# Patient Record
Sex: MALE | Race: WHITE | NOT HISPANIC OR LATINO | Employment: FULL TIME | URBAN - METROPOLITAN AREA
[De-identification: names, ages, dates, MRNs, and addresses within clinical notes are randomized per-mention and may not be internally consistent; named-entity substitution may affect disease eponyms.]

---
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
## 2020-01-10 ENCOUNTER — HOSPITAL ENCOUNTER (OUTPATIENT)
Dept: LAB | Facility: MEDICAL CENTER | Age: 20
End: 2020-01-10
Attending: PSYCHIATRY & NEUROLOGY
Payer: MEDICAID

## 2020-01-10 LAB
ALBUMIN SERPL BCP-MCNC: 5.1 G/DL (ref 3.2–4.9)
ALBUMIN/GLOB SERPL: 2.6 G/DL
ALP SERPL-CCNC: 79 U/L (ref 30–99)
ALT SERPL-CCNC: 72 U/L (ref 2–50)
ANION GAP SERPL CALC-SCNC: 10 MMOL/L (ref 0–11.9)
APPEARANCE UR: CLEAR
AST SERPL-CCNC: 30 U/L (ref 12–45)
BASOPHILS # BLD AUTO: 0.5 % (ref 0–1.8)
BASOPHILS # BLD: 0.02 K/UL (ref 0–0.12)
BILIRUB SERPL-MCNC: 1.1 MG/DL (ref 0.1–1.5)
BILIRUB UR QL STRIP.AUTO: NEGATIVE
BUN SERPL-MCNC: 15 MG/DL (ref 8–22)
CALCIUM SERPL-MCNC: 9.3 MG/DL (ref 8.5–10.5)
CHLORIDE SERPL-SCNC: 102 MMOL/L (ref 96–112)
CHOLEST SERPL-MCNC: 165 MG/DL (ref 100–199)
CO2 SERPL-SCNC: 27 MMOL/L (ref 20–33)
COLOR UR: ABNORMAL
CREAT SERPL-MCNC: 0.93 MG/DL (ref 0.5–1.4)
EOSINOPHIL # BLD AUTO: 0.1 K/UL (ref 0–0.51)
EOSINOPHIL NFR BLD: 2.5 % (ref 0–6.9)
ERYTHROCYTE [DISTWIDTH] IN BLOOD BY AUTOMATED COUNT: 40 FL (ref 35.9–50)
GLOBULIN SER CALC-MCNC: 2 G/DL (ref 1.9–3.5)
GLUCOSE SERPL-MCNC: 75 MG/DL (ref 65–99)
GLUCOSE UR STRIP.AUTO-MCNC: NEGATIVE MG/DL
HCT VFR BLD AUTO: 49.6 % (ref 42–52)
HDLC SERPL-MCNC: 56 MG/DL
HGB BLD-MCNC: 16.5 G/DL (ref 14–18)
IMM GRANULOCYTES # BLD AUTO: 0.02 K/UL (ref 0–0.11)
IMM GRANULOCYTES NFR BLD AUTO: 0.5 % (ref 0–0.9)
KETONES UR STRIP.AUTO-MCNC: 40 MG/DL
LDLC SERPL CALC-MCNC: 95 MG/DL
LEUKOCYTE ESTERASE UR QL STRIP.AUTO: NEGATIVE
LYMPHOCYTES # BLD AUTO: 1.79 K/UL (ref 1–4.8)
LYMPHOCYTES NFR BLD: 44.6 % (ref 22–41)
MCH RBC QN AUTO: 29.4 PG (ref 27–33)
MCHC RBC AUTO-ENTMCNC: 33.3 G/DL (ref 33.7–35.3)
MCV RBC AUTO: 88.4 FL (ref 81.4–97.8)
MICRO URNS: ABNORMAL
MONOCYTES # BLD AUTO: 0.38 K/UL (ref 0–0.85)
MONOCYTES NFR BLD AUTO: 9.5 % (ref 0–13.4)
NEUTROPHILS # BLD AUTO: 1.7 K/UL (ref 1.82–7.42)
NEUTROPHILS NFR BLD: 42.4 % (ref 44–72)
NITRITE UR QL STRIP.AUTO: NEGATIVE
NRBC # BLD AUTO: 0 K/UL
NRBC BLD-RTO: 0 /100 WBC
PH UR STRIP.AUTO: 5.5 [PH] (ref 5–8)
PLATELET # BLD AUTO: 285 K/UL (ref 164–446)
PMV BLD AUTO: 9.5 FL (ref 9–12.9)
POTASSIUM SERPL-SCNC: 4.1 MMOL/L (ref 3.6–5.5)
PROT SERPL-MCNC: 7.1 G/DL (ref 6–8.2)
PROT UR QL STRIP: NEGATIVE MG/DL
RBC # BLD AUTO: 5.61 M/UL (ref 4.7–6.1)
RBC UR QL AUTO: NEGATIVE
SODIUM SERPL-SCNC: 139 MMOL/L (ref 135–145)
SP GR UR STRIP.AUTO: 1.03
TRIGL SERPL-MCNC: 68 MG/DL (ref 0–149)
UROBILINOGEN UR STRIP.AUTO-MCNC: 1 MG/DL
WBC # BLD AUTO: 4 K/UL (ref 4.8–10.8)

## 2020-01-10 PROCEDURE — 81003 URINALYSIS AUTO W/O SCOPE: CPT

## 2020-01-10 PROCEDURE — 83001 ASSAY OF GONADOTROPIN (FSH): CPT

## 2020-01-10 PROCEDURE — 82746 ASSAY OF FOLIC ACID SERUM: CPT

## 2020-01-10 PROCEDURE — 81291 MTHFR GENE: CPT

## 2020-01-10 PROCEDURE — 84402 ASSAY OF FREE TESTOSTERONE: CPT

## 2020-01-10 PROCEDURE — 80061 LIPID PANEL: CPT

## 2020-01-10 PROCEDURE — 85025 COMPLETE CBC W/AUTO DIFF WBC: CPT

## 2020-01-10 PROCEDURE — 84270 ASSAY OF SEX HORMONE GLOBUL: CPT

## 2020-01-10 PROCEDURE — 82306 VITAMIN D 25 HYDROXY: CPT

## 2020-01-10 PROCEDURE — 84436 ASSAY OF TOTAL THYROXINE: CPT

## 2020-01-10 PROCEDURE — 84443 ASSAY THYROID STIM HORMONE: CPT

## 2020-01-10 PROCEDURE — 80053 COMPREHEN METABOLIC PANEL: CPT

## 2020-01-10 PROCEDURE — 84403 ASSAY OF TOTAL TESTOSTERONE: CPT

## 2020-01-10 PROCEDURE — 36415 COLL VENOUS BLD VENIPUNCTURE: CPT

## 2020-01-10 PROCEDURE — 83002 ASSAY OF GONADOTROPIN (LH): CPT

## 2020-01-10 PROCEDURE — 82607 VITAMIN B-12: CPT

## 2020-01-11 LAB
25(OH)D3 SERPL-MCNC: 30 NG/ML (ref 30–100)
FOLATE SERPL-MCNC: 22.3 NG/ML
FSH SERPL-ACNC: 5 MIU/ML (ref 1.5–12.4)
LH SERPL-ACNC: 3 IU/L (ref 1.7–8.6)
T4 SERPL-MCNC: 9.6 UG/DL (ref 4–12)
TSH SERPL DL<=0.005 MIU/L-ACNC: 1.63 UIU/ML (ref 0.38–5.33)
VIT B12 SERPL-MCNC: 457 PG/ML (ref 211–911)

## 2020-01-12 LAB
SHBG SERPL-SCNC: 47 NMOL/L (ref 11–80)
TESTOST FREE MFR SERPL: 1.6 % (ref 1.6–2.9)
TESTOST FREE SERPL-MCNC: 92 PG/ML (ref 47–244)
TESTOST SERPL-MCNC: 585 NG/DL (ref 300–1080)

## 2020-01-13 LAB
MTHFR C.1298A>C GENO BLD/T: ABNORMAL
MTHFR C.677C>T GENO BLD/T: ABNORMAL
MTHFR GENE MUT ANL BLD/T: ABNORMAL

## 2020-02-27 ENCOUNTER — APPOINTMENT (OUTPATIENT)
Dept: RADIOLOGY | Facility: MEDICAL CENTER | Age: 20
End: 2020-02-27
Attending: EMERGENCY MEDICINE
Payer: COMMERCIAL

## 2020-02-27 ENCOUNTER — HOSPITAL ENCOUNTER (EMERGENCY)
Facility: MEDICAL CENTER | Age: 20
End: 2020-02-27
Attending: EMERGENCY MEDICINE
Payer: COMMERCIAL

## 2020-02-27 VITALS
DIASTOLIC BLOOD PRESSURE: 66 MMHG | WEIGHT: 149.25 LBS | SYSTOLIC BLOOD PRESSURE: 124 MMHG | OXYGEN SATURATION: 97 % | HEART RATE: 82 BPM | RESPIRATION RATE: 12 BRPM | BODY MASS INDEX: 22.69 KG/M2 | TEMPERATURE: 98 F

## 2020-02-27 DIAGNOSIS — S16.1XXA STRAIN OF NECK MUSCLE, INITIAL ENCOUNTER: ICD-10-CM
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
## 2020-02-27 PROCEDURE — 99284 EMERGENCY DEPT VISIT MOD MDM: CPT

## 2020-02-27 PROCEDURE — 72125 CT NECK SPINE W/O DYE: CPT

## 2020-02-27 RX ORDER — IBUPROFEN 800 MG/1
800 TABLET ORAL EVERY 8 HOURS PRN
Qty: 30 TAB | Refills: 0 | Status: SHIPPED
Start: 2020-02-27 | End: 2020-08-10
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
## 2020-02-28 ENCOUNTER — HOSPITAL ENCOUNTER (OUTPATIENT)
Dept: RADIOLOGY | Facility: MEDICAL CENTER | Age: 20
End: 2020-02-28
Attending: CHIROPRACTOR
Payer: COMMERCIAL

## 2020-02-28 DIAGNOSIS — M99.03 SOMATIC DYSFUNCTION OF LUMBAR REGION: ICD-10-CM

## 2020-02-28 DIAGNOSIS — M99.04 SOMATIC DYSFUNCTION OF SACRAL REGION: ICD-10-CM

## 2020-02-28 DIAGNOSIS — M99.02 THORACIC SEGMENT DYSFUNCTION: ICD-10-CM

## 2020-02-28 DIAGNOSIS — M99.01 CERVICAL SEGMENT DYSFUNCTION: ICD-10-CM

## 2020-02-28 PROCEDURE — 72100 X-RAY EXAM L-S SPINE 2/3 VWS: CPT

## 2020-02-28 PROCEDURE — 72052 X-RAY EXAM NECK SPINE 6/>VWS: CPT

## 2020-02-28 PROCEDURE — 72170 X-RAY EXAM OF PELVIS: CPT

## 2020-02-28 PROCEDURE — 72072 X-RAY EXAM THORAC SPINE 3VWS: CPT

## 2020-02-28 NOTE — ED NOTES
Pt discharged at this time. Given all prescriptions and instructions. Verbalize understanding of all dc instructions. Released to self/mom via POV.

## 2020-05-06 ENCOUNTER — TELEMEDICINE (OUTPATIENT)
Dept: ENDOCRINOLOGY | Facility: MEDICAL CENTER | Age: 20
End: 2020-05-06
Payer: COMMERCIAL

## 2020-05-06 DIAGNOSIS — Z78.9 FEMALE-TO-MALE TRANSGENDER PERSON: ICD-10-CM

## 2020-05-06 PROCEDURE — 99203 OFFICE O/P NEW LOW 30 MIN: CPT | Mod: 95,CR | Performed by: INTERNAL MEDICINE

## 2020-05-06 NOTE — PROGRESS NOTES
Endocrinology Clinic Progress Note  PCP: Cherie Brian M.D.  This encounter was conducted via Zoom .   Verbal consent was obtained. Patient's identity was verified.    Reason for consult: transgender male to female      HPI:  Chris Larios is a 19 y.o. old patient who comes in today for evaluation of above stated problem . He states that he always had more predisposition to being a female since he was a kid. He stayed home alone a lot and he would use his mom's make up box and get ready as female and he enjoyed that. He is currently not sure if he would like to go on hormones for transformation to female personality and would like to think about it       ROS:  Constitutional: No weight loss  Cardiac: No palpitations or racing heart  Resp: No shortness of breath  Neuro: No numbness or tinging in feet  Endo: No heat or cold intolerance, no polyuria or polydipsia  All other systems were reviewed and were negative.    Past Medical History:  Patient Active Problem List    Diagnosis Date Noted   • History of epilepsy 06/13/2018     Priority: Medium   • Female-to-male transgender person 05/06/2020   • Attention deficit hyperactivity disorder (ADHD), combined type 12/09/2019   • Current moderate episode of major depressive disorder without prior episode (HCC) 06/13/2018   • Juvenile idiopathic scoliosis of thoracic region 06/13/2018       Past Surgical History:  Past Surgical History:   Procedure Laterality Date   • PACEMAKER INSERTION Left 12/12/2008    [employer] Adapta [id number] implanted in [place], [place].   • TONSILLECTOMY AND ADENOIDECTOMY         Allergies:  Patient has no known allergies.    Social History:  Social History     Socioeconomic History   • Marital status: Single     Spouse name: Not on file   • Number of children: Not on file   • Years of education: Not on file   • Highest education level: Not on file   Occupational History   • Not on file   Social Needs   • Financial resource strain: Not on file    • Food insecurity     Worry: Not on file     Inability: Not on file   • Transportation needs     Medical: Not on file     Non-medical: Not on file   Tobacco Use   • Smoking status: Never Smoker   • Smokeless tobacco: Never Used   Substance and Sexual Activity   • Alcohol use: No   • Drug use: No   • Sexual activity: Never     Comment: single, Senior year HS   Lifestyle   • Physical activity     Days per week: Not on file     Minutes per session: Not on file   • Stress: Not on file   Relationships   • Social connections     Talks on phone: Not on file     Gets together: Not on file     Attends L[employer]-day service: Not on file     Active member of club or organization: Not on file     Attends meetings of clubs or organizations: Not on file     Relationship status: Not on file   • Intimate partner violence     Fear of current or ex partner: Not on file     Emotionally abused: Not on file     Physically abused: Not on file     Forced sexual activity: Not on file   Other Topics Concern   • Behavioral problems Not Asked   • Interpersonal relationships Not Asked   • Sad or not enjoying activities Not Asked   • Suicidal thoughts Not Asked   • Poor school performance Not Asked   • Reading difficulties Not Asked   • Speech difficulties Not Asked   • Writing difficulties Not Asked   • Inadequate sleep Not Asked   • Excessive TV viewing Not Asked   • Excessive video game use Not Asked   • Inadequate exercise Not Asked   • Sports related Not Asked   • Poor diet Not Asked   • Family concerns for drug/alcohol abuse Not Asked   • Poor oral hygiene Not Asked   • Bike safety Not Asked   • Family concerns vehicle safety Not Asked   Social History Narrative   • Not on file       Family History:  Family History   Problem Relation Age of Onset   • Psychiatric Illness Mother         bipolar and Kristen   • Alcohol/Drug Father         alcoholism   • Cancer Brother         leukemia   • Diabetes Maternal Grandmother         Medications:    Current Outpatient Medications:   •  ibuprofen (MOTRIN) 800 MG Tab, Take 1 Tab by mouth every 8 hours as needed., Disp: 30 Tab, Rfl: 0  •  mirtazapine (REMERON) 15 MG Tab, Take 7.5 mg by mouth every evening. Indications: Major Depressive Disorder, Disp: , Rfl:     Labs: Reviewed    Physical Examination:  Vital signs: There were no vitals taken for this visit. There is no height or weight on file to calculate BMI.  General: No apparent distress, cooperative  Eyes: No scleral icterus or discharge  ENMT: Normal on external inspection of nose, lips, normal thyroid on inspection  Neck: No abnormal masses on inspection  Resp: Normal effort  Extremities: No visible edema  Neuro: Alert and oriented  Skin: No visible rash  Psych: Normal mood and affect, intact memory and able to make informed decision    Assessment and Plan:  1. Female-to-male transgender person   He is not sure of his decision to go on estradiol and or spironolactone. Suggested to think about it and let me know when ready.     Return in about 3 months (around 8/6/2020).    Thank you for allowing me to participate in the care of this patient.    Nilesh Norton M.D.  05/06/20    CC:   Cherie Brian M.D.    This note was created using voice recognition software (Dragon). The accuracy of the dictation is limited by the abilities of the software. I have reviewed the note prior to signing, however some errors in grammar and context are still possible. If you have any questions related to this note please do not hesitate to contact our office.

## 2020-08-09 NOTE — PROGRESS NOTES
Endocrinology Clinic Progress Note  PCP: Cherie Brian M.D.    HPI:  This encounter was conducted via zoom.  Verbal consent was obtained. Patient's identity was verified.  Chris Larios is a 20 y.o. old patient who comes in today for review of endocrine problems.    Male to Female Transgender:  She is not currently on hormone therapy. She is now ready for it.     ROS:  Constitutional: No unintentional weight loss  Endo: Denies excessive thirst or frequent urination  All other systems were reviewed and were negative.    Past Medical History:  Patient Active Problem List    Diagnosis Date Noted   • History of epilepsy 06/13/2018     Priority: Medium   • Female-to-male transgender person 05/06/2020   • Attention deficit hyperactivity disorder (ADHD), combined type 12/09/2019   • Current moderate episode of major depressive disorder without prior episode (HCC) 06/13/2018   • Juvenile idiopathic scoliosis of thoracic region 06/13/2018       Medications:    Current Outpatient Medications:   •  estradiol (ESTRACE) 1 MG Tab, Take 1 Tab by mouth every day., Disp: 90 Tab, Rfl: 3  •  spironolactone (ALDACTONE) 50 MG Tab, Take 1 Tab by mouth every day., Disp: 90 Tab, Rfl: 3  •  mirtazapine (REMERON) 15 MG Tab, Take 7.5 mg by mouth every evening. Indications: Major Depressive Disorder, Disp: , Rfl:     Labs: Reviewed    Physical Examination:  Vital signs: There were no vitals taken for this visit. There is no height or weight on file to calculate BMI. Patient's body mass index is unknown because there is no height or weight on file. General: No apparent distress, cooperative  Eyes: No scleral icterus or discharge  ENMT: Normal on external inspection of nose, lips, normal thyroid on inspection  Neck: No abnormal masses on inspection  Resp: Normal effort  Extremities: No visible edema  Neuro: Alert and oriented  Skin: No visible rash  Psych: Normal mood and affect, intact memory and able to make informed  decisions    Assessment and Plan:    1. Female-to-male transgender person  Start estradiol 1 mg daily and spironolactone 50 mg once daily;side effects and benefits discussed with patient in detail.     2. High risk medication use:   Considering estradiol and spironolactone use; we will check CMP and lipids in future once on these meds for 3 to 6 months.     Return in about 3 months (around 11/10/2020).    Thank you for allowing me to participate in the care of this patient.        CC:   Cherie Brian M.D.    This note was created using voice recognition software (Dragon). The accuracy of the dictation is limited by the abilities of the software. I have reviewed the note prior to signing, however some errors in grammar and context are still possible. If you have any questions related to this note please do not hesitate to contact our office.

## 2020-08-10 ENCOUNTER — TELEMEDICINE (OUTPATIENT)
Dept: ENDOCRINOLOGY | Facility: MEDICAL CENTER | Age: 20
End: 2020-08-10
Attending: INTERNAL MEDICINE
Payer: COMMERCIAL

## 2020-08-10 DIAGNOSIS — Z78.9 FEMALE-TO-MALE TRANSGENDER PERSON: ICD-10-CM

## 2020-08-10 DIAGNOSIS — Z79.899 HIGH RISK MEDICATION USE: ICD-10-CM

## 2020-08-10 PROCEDURE — 99214 OFFICE O/P EST MOD 30 MIN: CPT | Mod: 95,CR | Performed by: INTERNAL MEDICINE

## 2020-08-10 RX ORDER — ESTRADIOL 1 MG/1
1 TABLET ORAL DAILY
Qty: 90 TAB | Refills: 3 | Status: SHIPPED | OUTPATIENT
Start: 2020-08-10 | End: 2020-12-28 | Stop reason: SDUPTHER

## 2020-08-10 RX ORDER — SPIRONOLACTONE 50 MG/1
50 TABLET, FILM COATED ORAL DAILY
Qty: 90 TAB | Refills: 3 | Status: SHIPPED | OUTPATIENT
Start: 2020-08-10 | End: 2021-01-05 | Stop reason: SDUPTHER

## 2020-08-11 ENCOUNTER — TELEPHONE (OUTPATIENT)
Dept: ENDOCRINOLOGY | Facility: MEDICAL CENTER | Age: 20
End: 2020-08-11

## 2020-08-11 NOTE — TELEPHONE ENCOUNTER
Patient mom called saying that this patient previously had a pace maker that was taken out two years ago. No issues since. Just want to make sure the medication does not aggravate it.  Also had absent epilepsy.   [phone]

## 2020-08-12 NOTE — TELEPHONE ENCOUNTER
Phone Number Called: [phone] (home)       Call outcome: Spoke to patient regarding message below.    Message: Spoke to patient to let them know of Dr Cruz response.

## 2020-12-28 ENCOUNTER — OFFICE VISIT (OUTPATIENT)
Dept: MEDICAL GROUP | Facility: PHYSICIAN GROUP | Age: 20
End: 2020-12-28
Payer: COMMERCIAL

## 2020-12-28 VITALS
BODY MASS INDEX: 24.96 KG/M2 | OXYGEN SATURATION: 90 % | HEART RATE: 88 BPM | TEMPERATURE: 98.7 F | SYSTOLIC BLOOD PRESSURE: 108 MMHG | DIASTOLIC BLOOD PRESSURE: 80 MMHG | WEIGHT: 159 LBS | RESPIRATION RATE: 18 BRPM | HEIGHT: 67 IN

## 2020-12-28 DIAGNOSIS — F90.2 ATTENTION DEFICIT HYPERACTIVITY DISORDER (ADHD), COMBINED TYPE: ICD-10-CM

## 2020-12-28 DIAGNOSIS — Z78.9 MALE-TO-FEMALE TRANSGENDER PERSON: ICD-10-CM

## 2020-12-28 DIAGNOSIS — Z79.899 HIGH RISK MEDICATION USE: ICD-10-CM

## 2020-12-28 PROCEDURE — 99214 OFFICE O/P EST MOD 30 MIN: CPT | Performed by: FAMILY MEDICINE

## 2020-12-28 RX ORDER — ESTRADIOL 1 MG/1
1 TABLET ORAL DAILY
Qty: 90 TAB | Refills: 0 | Status: SHIPPED | OUTPATIENT
Start: 2020-12-28 | End: 2021-01-05 | Stop reason: SDUPTHER

## 2020-12-28 ASSESSMENT — PATIENT HEALTH QUESTIONNAIRE - PHQ9
2. FEELING DOWN, DEPRESSED, IRRITABLE, OR HOPELESS: NOT AT ALL
5. POOR APPETITE OR OVEREATING: NOT AT ALL
7. TROUBLE CONCENTRATING ON THINGS, SUCH AS READING THE NEWSPAPER OR WATCHING TELEVISION: SEVERAL DAYS
6. FEELING BAD ABOUT YOURSELF - OR THAT YOU ARE A FAILURE OR HAVE LET YOURSELF OR YOUR FAMILY DOWN: NOT AL ALL
9. THOUGHTS THAT YOU WOULD BE BETTER OFF DEAD, OR OF HURTING YOURSELF: NOT AT ALL
1. LITTLE INTEREST OR PLEASURE IN DOING THINGS: MORE THAN HALF THE DAYS
SUM OF ALL RESPONSES TO PHQ QUESTIONS 1-9: 7
SUM OF ALL RESPONSES TO PHQ9 QUESTIONS 1 AND 2: 2
8. MOVING OR SPEAKING SO SLOWLY THAT OTHER PEOPLE COULD HAVE NOTICED. OR THE OPPOSITE, BEING SO FIGETY OR RESTLESS THAT YOU HAVE BEEN MOVING AROUND A LOT MORE THAN USUAL: NOT AT ALL
3. TROUBLE FALLING OR STAYING ASLEEP OR SLEEPING TOO MUCH: MORE THAN HALF THE DAYS
4. FEELING TIRED OR HAVING LITTLE ENERGY: MORE THAN HALF THE DAYS

## 2020-12-28 ASSESSMENT — FIBROSIS 4 INDEX: FIB4 SCORE: 0.25

## 2020-12-28 NOTE — ASSESSMENT & PLAN NOTE
This patient was previously seeing Dr. Norton to help her with her transgender hormones.  He is no longer with [facility].  Patient would like to continue within the [facility] system.  I have put in an urgent referral for her to see Dr. Sekou Duenas

## 2020-12-28 NOTE — PROGRESS NOTES
CC:Diagnoses of Attention deficit hyperactivity disorder (ADHD), combined type, Male-to-female transgender person, and High risk medication use were pertinent to this visit.    HISTORY OF PRESENT ILLNESS: Patient is a 20 y.o. adult established female patient who presents today to talk about 2 problems.  This patient needs to get an EKG done for his psychiatrist and needs me to do that order for her as her primary care.      Attention deficit hyperactivity disorder (ADHD), combined type  This is a chronic problem for this patient who sees a psychiatrist who is interested in starting her on ADHD meds.  We need to get an EKG prior to make certain that her cardiac function is completely normal.  I will order that and then have a copy of the EKG sent to Dr. Guillen her psychiatrist.    Male-to-female transgender person  This patient was previously seeing Dr. Norton to help her with her transgender hormones.  He is no longer with [facility].  Patient would like to continue within the [facility] system.  I have put in an urgent referral for her to see Dr. Sekou Duenas      Patient Active Problem List    Diagnosis Date Noted   • History of epilepsy 06/13/2018     Priority: Medium   • Male-to-female transgender person 05/06/2020   • Attention deficit hyperactivity disorder (ADHD), combined type 12/09/2019   • Current moderate episode of major depressive disorder without prior episode (HCC) 06/13/2018   • Juvenile idiopathic scoliosis of thoracic region 06/13/2018      Allergies:Patient has no known allergies.    Current Outpatient Medications   Medication Sig Dispense Refill   • estradiol (ESTRACE) 1 MG Tab Take 1 Tab by mouth every day. 90 Tab 0   • spironolactone (ALDACTONE) 50 MG Tab Take 1 Tab by mouth every day. 90 Tab 3   • mirtazapine (REMERON) 15 MG Tab Take 7.5 mg by mouth every evening. Indications: Major Depressive Disorder       No current facility-administered medications for this visit.        Social History  "    Tobacco Use   • Smoking status: Never Smoker   • Smokeless tobacco: Never Used   Substance Use Topics   • Alcohol use: No   • Drug use: No     Social History     Social History Narrative   • Not on file       Family History   Problem Relation Age of Onset   • Psychiatric Illness Mother         bipolar and Kristen   • Alcohol/Drug Father         alcoholism   • Cancer Brother         leukemia   • Diabetes Maternal Grandmother         ROS:     - Constitutional:  Negative for fever, chills, unexpected weight change, and fatigue/generalized weakness.    - HEENT:  Negative for headaches, vision changes, hearing changes, ear pain, ear discharge, rhinorrhea, sinus congestion, sore throat, and neck pain.      - Respiratory: Negative for cough, sputum production, chest congestion, dyspnea, wheezing, and crackles.      - Cardiovascular: Negative for chest pain, palpitations, orthopnea, and bilateral lower extremity edema.     - Gastrointestinal: Negative for heartburn, nausea, vomiting, abdominal pain, hematochezia, melena, diarrhea, constipation, and greasy/foul-smelling stools.     - Genitourinary: Negative for dysuria, polyuria, hematuria, pyuria, urinary urgency, and urinary incontinence.     - Musculoskeletal: Negative for myalgias, back pain, and joint pain.     - Skin: Negative for rash, itching, cyanotic skin color change.     - Neurological: Negative for dizziness, tingling, tremors, focal sensory deficit, focal weakness and headaches.     - Endo/Heme/Allergies: Patient has been running out of her estradiol so using less than 1 tablet daily.     - Psychiatric/Behavioral: Negative for depression, suicidal/homicidal ideation and memory loss.          - NOTE: All other systems reviewed and are negative, except as in HPI.      Exam:    /80 (BP Location: Left arm, Patient Position: Sitting, BP Cuff Size: Adult)   Pulse 88   Temp 37.1 °C (98.7 °F) (Temporal)   Resp 18   Ht 1.702 m (5' 7\")   Wt 72.1 kg (159 lb)   " SpO2 90%  Body mass index is 24.9 kg/m².    General:  Well nourished, well developed adult in NAD  Head is grossly normal.    Patient was seen for 25 minutes face to face of which, 20 minutes was spent counseling regarding discussion of need for medications (hormones) and EKG prior to treatment for ADHD..    Please note that this dictation was created using voice recognition software. I have made every reasonable attempt to correct obvious errors, but I expect that there are errors of grammar and possibly content that I did not discover before finalizing the note.    Assessment/Plan:  1. Attention deficit hyperactivity disorder (ADHD), combined type  Uncontrolled, patient will get an EKG done so that Dr. Guillen will be able to start medication.  - EKG - Cardiology Performed; Future    2. Male-to-female transgender person  Patient needs a refill of her estradiol 1 mg daily.  Gave her 90 days with no refills and she will see endocrinology  - estradiol (ESTRACE) 1 MG Tab; Take 1 Tab by mouth every day.  Dispense: 90 Tab; Refill: 0  - REFERRAL TO ENDOCRINOLOGY    3. High risk medication use  Refilled her estradiol as she goes through the process of transgender from male to female.  - estradiol (ESTRACE) 1 MG Tab; Take 1 Tab by mouth every day.  Dispense: 90 Tab; Refill: 0

## 2020-12-28 NOTE — ASSESSMENT & PLAN NOTE
This is a chronic problem for this patient who sees a psychiatrist who is interested in starting her on ADHD meds.  We need to get an EKG prior to make certain that her cardiac function is completely normal.  I will order that and then have a copy of the EKG sent to Dr. Guillen her psychiatrist.

## 2020-12-29 ENCOUNTER — APPOINTMENT (OUTPATIENT)
Dept: ENDOCRINOLOGY | Facility: MEDICAL CENTER | Age: 20
End: 2020-12-29
Attending: INTERNAL MEDICINE
Payer: MEDICAID

## 2020-12-29 ENCOUNTER — HOSPITAL ENCOUNTER (OUTPATIENT)
Dept: CARDIOLOGY | Facility: MEDICAL CENTER | Age: 20
End: 2020-12-29
Attending: FAMILY MEDICINE
Payer: MEDICAID

## 2020-12-29 DIAGNOSIS — Z78.9 MALE-TO-FEMALE TRANSGENDER PERSON: ICD-10-CM

## 2020-12-29 DIAGNOSIS — F90.2 ATTENTION DEFICIT HYPERACTIVITY DISORDER (ADHD), COMBINED TYPE: Primary | ICD-10-CM

## 2021-01-05 ENCOUNTER — TELEMEDICINE (OUTPATIENT)
Dept: ENDOCRINOLOGY | Facility: MEDICAL CENTER | Age: 21
End: 2021-01-05
Attending: INTERNAL MEDICINE
Payer: COMMERCIAL

## 2021-01-05 DIAGNOSIS — Z79.899 HIGH RISK MEDICATION USE: ICD-10-CM

## 2021-01-05 DIAGNOSIS — Z78.9 MALE-TO-FEMALE TRANSGENDER PERSON: ICD-10-CM

## 2021-01-05 DIAGNOSIS — F32.0 CURRENT MILD EPISODE OF MAJOR DEPRESSIVE DISORDER, UNSPECIFIED WHETHER RECURRENT (HCC): ICD-10-CM

## 2021-01-05 PROCEDURE — 99213 OFFICE O/P EST LOW 20 MIN: CPT | Mod: 95,CR | Performed by: INTERNAL MEDICINE

## 2021-01-05 RX ORDER — SPIRONOLACTONE 50 MG/1
50 TABLET, FILM COATED ORAL DAILY
Qty: 90 TAB | Refills: 3 | Status: SHIPPED | OUTPATIENT
Start: 2021-01-05 | End: 2021-08-18 | Stop reason: SDUPTHER

## 2021-01-05 RX ORDER — ESTRADIOL 1 MG/1
1 TABLET ORAL DAILY
Qty: 90 TAB | Refills: 1 | Status: SHIPPED | OUTPATIENT
Start: 2021-01-05 | End: 2021-05-19 | Stop reason: SDUPTHER

## 2021-01-05 NOTE — PROGRESS NOTES
Chief Complaint: Follow up for  Gender Dysphoria, Male to Female Transgender.  Patient was presented for a telehealth consultation via secure and encrypted videoconferencing technology. This encounter was conducted via Zoom . Verbal consent was obtained. Patient's identity was verified.      HPI:     Chris Larios is a 20 y.o. adult here for follow up of gender dysphoria and male to female transgender    SHe was previously seen by another endocrinologist and this is my first time seeing him.    She is currently taking estradiol 1 mg daily and spironolactone 50 mg daily.  She has a history of depression and is taking mirtazapine 7.5 mg nightly.  She is being followed by a psychiatrist for her depression.  She denies suicidal thoughts and active depression    She has been on hormone therapy for the past 3 months and she has noted changes in her skin and body fat and development of small breast buds.  She reports that she is currently happy and she does not want any changes in her medications at this time.    Unfortunately we do not have any updated estradiol levels and testosterone levels on hand    Her last potassium was 4.1 on January 10, 2020 calcium was 9.3 GFR was greater than 60      Patient's medications, allergies, and social histories were reviewed and updated as appropriate.      ROS:       CONS:     No fever, no chills   EYES:     No diplopia, no blurry vision   CV:           No chest pain, no palpitations   PULM:     No SOB, no cough, no hemoptysis.   GI:            No nausea, no vomiting, no diarrhea, no constipation   ENDO:     No polyuria, no polydipsia, no heat intolerance, no cold intolerance     Past Medical History:  Problem List:  2020-05: Male-to-female transgender person  2019-12: Attention deficit hyperactivity disorder (ADHD), combined   type  2018-08: Sick sinus syndrome (HCC)  2018-06: Cardiac pacemaker in situ  2018-06: History of epilepsy  2018-06: Current moderate episode of major  depressive disorder   without prior episode ([facility])  2018-06: Juvenile idiopathic scoliosis of thoracic region      Past Surgical History:  Past Surgical History:   Procedure Laterality Date   • PACEMAKER INSERTION Left 12/12/2008    Medtronic Adapta [id number] implanted in [place], [place].   • TONSILLECTOMY AND ADENOIDECTOMY          Allergies:  Patient has no known allergies.     Social History:  Social History     Tobacco Use   • Smoking status: Never Smoker   • Smokeless tobacco: Never Used   Substance Use Topics   • Alcohol use: No   • Drug use: No        Family History:   family history includes Alcohol/Drug in her father; Cancer in her brother; Diabetes in her maternal grandmother; Psychiatric Illness in her mother.      PHYSICAL EXAM:   Vital signs: There were no vitals taken for this visit.  GENERAL: Well-developed, well-nourished in no apparent distress.   EYE:  No ocular asymmetry, PERRLA  HENT: Pink, moist mucous membranes.    NECK: No thyromegaly.   CARDIOVASCULAR:  No murmurs  LUNGS: Clear breath sounds  ABDOMEN: Soft, nontender   EXTREMITIES: No clubbing, cyanosis, or edema.   NEUROLOGICAL: No gross focal motor abnormalities   LYMPH: No cervical adenopathy seen   SKIN: No rashes, lesions.       Labs:  Lab Results   Component Value Date/Time    WBC 4.0 (L) 01/10/2020 08:26 AM    RBC 5.61 01/10/2020 08:26 AM    HEMOGLOBIN 16.5 01/10/2020 08:26 AM    MCV 88.4 01/10/2020 08:26 AM    MCH 29.4 01/10/2020 08:26 AM    MCHC 33.3 (L) 01/10/2020 08:26 AM    RDW 40.0 01/10/2020 08:26 AM    MPV 9.5 01/10/2020 08:26 AM       Lab Results   Component Value Date/Time    SODIUM 139 01/10/2020 08:26 AM    POTASSIUM 4.1 01/10/2020 08:26 AM    CHLORIDE 102 01/10/2020 08:26 AM    CO2 27 01/10/2020 08:26 AM    ANION 10.0 01/10/2020 08:26 AM    GLUCOSE 75 01/10/2020 08:26 AM    BUN 15 01/10/2020 08:26 AM    CREATININE 0.93 01/10/2020 08:26 AM    CREATININE 0.6 11/04/2008 08:05 AM    CALCIUM 9.3 01/10/2020 08:26 AM    ASTSGOT 30  01/10/2020 08:26 AM    ALTSGPT 72 (H) 01/10/2020 08:26 AM    TBILIRUBIN 1.1 01/10/2020 08:26 AM    ALBUMIN 5.1 (H) 01/10/2020 08:26 AM    TOTPROTEIN 7.1 01/10/2020 08:26 AM    GLOBULIN 2.0 01/10/2020 08:26 AM    AGRATIO 2.6 01/10/2020 08:26 AM       Lab Results   Component Value Date/Time    TSHFELICIA 1.630 01/10/2020 0826     No results found for: FREEDIR  No results found for: FREET3  No results found for: THYSTIMIG      Imaging:    ASSESSMENT/PLAN:     1. Male-to-female transgender person  Stable continue estradiol and spironolactone reviewed side effects of medications and there benefits.  We will recheck estradiol and testosterone levels on follow-up    2. High risk medication use  Patient is taking estrogen and spironolactone which are high risk medications    3. Current mild episode of major depressive disorder, unspecified whether recurrent (HCC)  Stable continue mirtazapine continue follow-up with psychiatry      Return in about 4 months (around 5/5/2021).      This patient during there office visit today was started on a new medication.  Side effects of the new medication were discussed with the patient today in the office.     Thank you kindly for allowing me to participate in the endocrine care plan for this patient.    Sekou Duenas MD, [place], [facility]  01/05/21    CC:   Cherie Brian M.D.

## 2021-05-07 ENCOUNTER — TELEMEDICINE (OUTPATIENT)
Dept: ENDOCRINOLOGY | Facility: MEDICAL CENTER | Age: 21
End: 2021-05-07
Attending: INTERNAL MEDICINE
Payer: COMMERCIAL

## 2021-05-07 DIAGNOSIS — F32.0 CURRENT MILD EPISODE OF MAJOR DEPRESSIVE DISORDER, UNSPECIFIED WHETHER RECURRENT (HCC): ICD-10-CM

## 2021-05-07 DIAGNOSIS — Z78.9 MALE-TO-FEMALE TRANSGENDER PERSON: ICD-10-CM

## 2021-05-07 DIAGNOSIS — Z79.899 HIGH RISK MEDICATION USE: ICD-10-CM

## 2021-05-07 PROCEDURE — 99214 OFFICE O/P EST MOD 30 MIN: CPT | Mod: 95 | Performed by: INTERNAL MEDICINE

## 2021-05-07 NOTE — PROGRESS NOTES
Chief Complaint: Follow up for  Gender Dysphoria, Male to Female Transgender.  Patient was presented for a telehealth consultation via secure and encrypted videoconferencing technology. This encounter was conducted via Zoom . Verbal consent was obtained. Patient's identity was verified.    HPI:     Chris Larios is a 20 y.o. adult here for follow up of gender dysphoria and male to female transgender    She was previously seen by another endocrinologist and this is my first time seeing him.    Comorbid conditions include ADHD and depression      She is currently taking estradiol 1 mg daily and spironolactone 50 mg daily.     Patient was instructed to get labs prior to this encounter to measure estradiol and testosterone levels for monitoring to make sure that feminization is being achieved with gender affirming therapy    We also emphasized that potassium monitoring is important because spironolactone can cause hyperkalemia    Unfortunately patient forgot to do her labs      Patient's medications, allergies, and social histories were reviewed and updated as appropriate.      ROS:       CONS:     No fever, no chills   EYES:     No diplopia, no blurry vision   CV:           No chest pain, no palpitations   PULM:     No SOB, no cough, no hemoptysis.   GI:            No nausea, no vomiting, no diarrhea, no constipation   ENDO:     No polyuria, no polydipsia, no heat intolerance, no cold intolerance     Past Medical History:  Problem List:  2021-01: High risk medication use  2020-05: Male-to-female transgender person  2019-12: Attention deficit hyperactivity disorder (ADHD), combined   type  2018-08: Sick sinus syndrome (HCC)  2018-06: Cardiac pacemaker in situ  2018-06: History of epilepsy  2018-06: Current moderate episode of major depressive disorder   without prior episode (HCC)  2018-06: Juvenile idiopathic scoliosis of thoracic region      Past Surgical History:  Past Surgical History:   Procedure Laterality  Date   • PACEMAKER INSERTION Left 12/12/2008    Medtronic Adapta ADDR01 implanted in [place], [place].   • TONSILLECTOMY AND ADENOIDECTOMY          Allergies:  Patient has no known allergies.     Social History:  Social History     Tobacco Use   • Smoking status: Never Smoker   • Smokeless tobacco: Never Used   Substance Use Topics   • Alcohol use: No   • Drug use: No        Family History:   family history includes Alcohol/Drug in her father; Cancer in her brother; Diabetes in her maternal grandmother; Psychiatric Illness in her mother.      PHYSICAL EXAM:   Vital signs: There were no vitals taken for this visit.  GENERAL: Well-developed, well-nourished in no apparent distress.   EYE:  No ocular asymmetry, PERRLA  HENT: Pink, moist mucous membranes.    NECK: No thyromegaly.   CARDIOVASCULAR:  No murmurs  LUNGS: Clear breath sounds  ABDOMEN: Soft, nontender   EXTREMITIES: No clubbing, cyanosis, or edema.   NEUROLOGICAL: No gross focal motor abnormalities   LYMPH: No cervical adenopathy seen   SKIN: No rashes, lesions.       Labs:  Lab Results   Component Value Date/Time    WBC 4.0 (L) 01/10/2020 08:26 AM    RBC 5.61 01/10/2020 08:26 AM    HEMOGLOBIN 16.5 01/10/2020 08:26 AM    MCV 88.4 01/10/2020 08:26 AM    MCH 29.4 01/10/2020 08:26 AM    MCHC 33.3 (L) 01/10/2020 08:26 AM    RDW 40.0 01/10/2020 08:26 AM    MPV 9.5 01/10/2020 08:26 AM       Lab Results   Component Value Date/Time    SODIUM 139 01/10/2020 08:26 AM    POTASSIUM 4.1 01/10/2020 08:26 AM    CHLORIDE 102 01/10/2020 08:26 AM    CO2 27 01/10/2020 08:26 AM    ANION 10.0 01/10/2020 08:26 AM    GLUCOSE 75 01/10/2020 08:26 AM    BUN 15 01/10/2020 08:26 AM    CREATININE 0.93 01/10/2020 08:26 AM    CREATININE 0.6 11/04/2008 08:05 AM    CALCIUM 9.3 01/10/2020 08:26 AM    ASTSGOT 30 01/10/2020 08:26 AM    ALTSGPT 72 (H) 01/10/2020 08:26 AM    TBILIRUBIN 1.1 01/10/2020 08:26 AM    ALBUMIN 5.1 (H) 01/10/2020 08:26 AM    TOTPROTEIN 7.1 01/10/2020 08:26 AM    GLOBULIN 2.0  01/10/2020 08:26 AM    AGRATIO 2.6 01/10/2020 08:26 AM       Lab Results   Component Value Date/Time    TSHFELICIA 1.630 01/10/2020 0826     No results found for: FREEDIR  No results found for: FREET3  No results found for: THYSTIMIG      Imaging:    ASSESSMENT/PLAN:     1. Male-to-female transgender person  Stable continue estradiol and spironolactone   Explained to patient the rationale for lab work with gender affirming therapy  Goal is to keep testosterone less than 50  Goal is to maintain estradiol levels at physiologic levels  We also need to monitor potassium because spironolactone is a high risk medication  Patient is going to get labs tomorrow I will update her  I will see her again in 4 months with repeat of her labs for estradiol, testosterone, SHBG, free testosterone, serum creatinine and potassium levels      2. High risk medication use  Patient is taking estrogen and spironolactone which are high risk medications    3. Current mild episode of major depressive disorder, unspecified whether recurrent (HCC)  Stable continue mirtazapine continue follow-up with psychiatry      Return in about 4 months (around 9/7/2021).      Thank you kindly for allowing me to participate in the endocrine care plan for this patient.    Sekou Duenas MD, F[facility], [facility]–1983  01/05/21    CC:   Cherie Brian M.D.

## 2021-05-12 ENCOUNTER — HOSPITAL ENCOUNTER (OUTPATIENT)
Dept: LAB | Facility: MEDICAL CENTER | Age: 21
End: 2021-05-12
Attending: INTERNAL MEDICINE
Payer: COMMERCIAL

## 2021-05-12 DIAGNOSIS — F32.0 CURRENT MILD EPISODE OF MAJOR DEPRESSIVE DISORDER, UNSPECIFIED WHETHER RECURRENT (HCC): ICD-10-CM

## 2021-05-12 DIAGNOSIS — Z79.899 HIGH RISK MEDICATION USE: ICD-10-CM

## 2021-05-12 DIAGNOSIS — Z78.9 MALE-TO-FEMALE TRANSGENDER PERSON: ICD-10-CM

## 2021-05-12 LAB
ALBUMIN SERPL BCP-MCNC: 4.5 G/DL (ref 3.2–4.9)
ALBUMIN/GLOB SERPL: 1.9 G/DL
ALP SERPL-CCNC: 77 U/L (ref 30–99)
ALT SERPL-CCNC: 12 U/L (ref 2–50)
ANION GAP SERPL CALC-SCNC: 7 MMOL/L (ref 7–16)
AST SERPL-CCNC: 16 U/L (ref 12–45)
BASOPHILS # BLD AUTO: 0.4 % (ref 0–1.8)
BASOPHILS # BLD: 0.03 K/UL (ref 0–0.12)
BILIRUB SERPL-MCNC: 0.3 MG/DL (ref 0.1–1.5)
BUN SERPL-MCNC: 13 MG/DL (ref 8–22)
CALCIUM SERPL-MCNC: 9.4 MG/DL (ref 8.5–10.5)
CHLORIDE SERPL-SCNC: 106 MMOL/L (ref 96–112)
CO2 SERPL-SCNC: 28 MMOL/L (ref 20–33)
CREAT SERPL-MCNC: 0.94 MG/DL (ref 0.5–1.4)
EOSINOPHIL # BLD AUTO: 0.15 K/UL (ref 0–0.51)
EOSINOPHIL NFR BLD: 2.1 % (ref 0–6.9)
ERYTHROCYTE [DISTWIDTH] IN BLOOD BY AUTOMATED COUNT: 42.6 FL (ref 35.9–50)
GLOBULIN SER CALC-MCNC: 2.4 G/DL (ref 1.9–3.5)
GLUCOSE SERPL-MCNC: 96 MG/DL (ref 65–99)
HCT VFR BLD AUTO: 48.4 % (ref 42–52)
HGB BLD-MCNC: 15.2 G/DL (ref 14–18)
IMM GRANULOCYTES # BLD AUTO: 0.03 K/UL (ref 0–0.11)
IMM GRANULOCYTES NFR BLD AUTO: 0.4 % (ref 0–0.9)
LYMPHOCYTES # BLD AUTO: 2.59 K/UL (ref 1–4.8)
LYMPHOCYTES NFR BLD: 37.1 % (ref 22–41)
MCH RBC QN AUTO: 29.2 PG (ref 27–33)
MCHC RBC AUTO-ENTMCNC: 31.4 G/DL (ref 33.7–35.3)
MCV RBC AUTO: 92.9 FL (ref 81.4–97.8)
MONOCYTES # BLD AUTO: 0.51 K/UL (ref 0–0.85)
MONOCYTES NFR BLD AUTO: 7.3 % (ref 0–13.4)
NEUTROPHILS # BLD AUTO: 3.68 K/UL (ref 1.82–7.42)
NEUTROPHILS NFR BLD: 52.7 % (ref 44–72)
NRBC # BLD AUTO: 0 K/UL
NRBC BLD-RTO: 0 /100 WBC
PLATELET # BLD AUTO: 263 K/UL (ref 164–446)
PMV BLD AUTO: 10.5 FL (ref 9–12.9)
POTASSIUM SERPL-SCNC: 4.3 MMOL/L (ref 3.6–5.5)
PROT SERPL-MCNC: 6.9 G/DL (ref 6–8.2)
RBC # BLD AUTO: 5.21 M/UL (ref 4.7–6.1)
SODIUM SERPL-SCNC: 141 MMOL/L (ref 135–145)
WBC # BLD AUTO: 7 K/UL (ref 4.8–10.8)

## 2021-05-12 PROCEDURE — 82670 ASSAY OF TOTAL ESTRADIOL: CPT

## 2021-05-12 PROCEDURE — 80053 COMPREHEN METABOLIC PANEL: CPT

## 2021-05-12 PROCEDURE — 84270 ASSAY OF SEX HORMONE GLOBUL: CPT

## 2021-05-12 PROCEDURE — 85025 COMPLETE CBC W/AUTO DIFF WBC: CPT

## 2021-05-12 PROCEDURE — 36415 COLL VENOUS BLD VENIPUNCTURE: CPT

## 2021-05-14 LAB
ESTRADIOL SERPL-MCNC: 29 PG/ML
SHBG SERPL-SCNC: 32 NMOL/L (ref 11–80)

## 2021-05-19 DIAGNOSIS — Z79.899 HIGH RISK MEDICATION USE: ICD-10-CM

## 2021-05-19 DIAGNOSIS — Z78.9 MALE-TO-FEMALE TRANSGENDER PERSON: ICD-10-CM

## 2021-05-19 RX ORDER — ESTRADIOL 2 MG/1
2 TABLET ORAL DAILY
Qty: 90 TABLET | Refills: 1 | Status: SHIPPED | OUTPATIENT
Start: 2021-05-19 | End: 2021-08-18 | Stop reason: SDUPTHER

## 2021-05-24 ENCOUNTER — OFFICE VISIT (OUTPATIENT)
Dept: MEDICAL GROUP | Facility: LAB | Age: 21
End: 2021-05-24
Payer: COMMERCIAL

## 2021-05-24 VITALS
WEIGHT: 159 LBS | HEART RATE: 88 BPM | DIASTOLIC BLOOD PRESSURE: 64 MMHG | HEIGHT: 67 IN | BODY MASS INDEX: 24.96 KG/M2 | RESPIRATION RATE: 14 BRPM | SYSTOLIC BLOOD PRESSURE: 106 MMHG | OXYGEN SATURATION: 98 % | TEMPERATURE: 97.3 F

## 2021-05-24 DIAGNOSIS — F90.2 ATTENTION DEFICIT HYPERACTIVITY DISORDER (ADHD), COMBINED TYPE: ICD-10-CM

## 2021-05-24 DIAGNOSIS — F32.1 CURRENT MODERATE EPISODE OF MAJOR DEPRESSIVE DISORDER WITHOUT PRIOR EPISODE (HCC): ICD-10-CM

## 2021-05-24 DIAGNOSIS — Z78.9 MALE-TO-FEMALE TRANSGENDER PERSON: ICD-10-CM

## 2021-05-24 PROCEDURE — 99204 OFFICE O/P NEW MOD 45 MIN: CPT | Performed by: NURSE PRACTITIONER

## 2021-05-24 ASSESSMENT — PATIENT HEALTH QUESTIONNAIRE - PHQ9
SUM OF ALL RESPONSES TO PHQ QUESTIONS 1-9: 10
5. POOR APPETITE OR OVEREATING: 1 - SEVERAL DAYS
CLINICAL INTERPRETATION OF PHQ2 SCORE: 2

## 2021-05-24 ASSESSMENT — FIBROSIS 4 INDEX: FIB4 SCORE: 0.35

## 2021-05-24 NOTE — PROGRESS NOTES
"Subjective:     CC:  Diagnoses of Attention deficit hyperactivity disorder (ADHD), combined type, Current moderate episode of major depressive disorder without prior episode (HCC), and Male-to-female transgender person were pertinent to this visit.    HISTORY OF THE PRESENT ILLNESS: Patient is a 20 y.o. adult. This pleasant patient is here today to establish care and discuss the following:    Attention deficit hyperactivity disorder (ADHD), combined type  This is a chronic condition. Patient was diagnosed in 2019 by a psychiatrist. Currently seeing Dr. Guillen. Patient is hoping to get treated for her ADHD, but requires an EKG before medication can be started because of a childhood history of sick sinus syndrome. Patient had pacemaker removed about 3 year ago.     EKG Interpretation   Ordered and interpreted by JUAN Tyler  Rhythm: normal sinus   Rate: normal   Axis: normal   Ectopy: none   Conduction: normal   ST Segments: no acute change   T Waves: no acute change   Q Waves: none   Clinical Impression: no acute changes and normal EKG    Current moderate episode of major depressive disorder without prior episode (HCC)  -This is a chronic condition, new to me. Patient is currently seeing Dr. Guillen for management.   -PHQ-9 today is 10 and patient did describe fleeting and vague thoughts of suicide: \"I am just a mistake.\" Patient denies a plan, and states that she would never go through with a suicide attempt.   -Discussed increasing/changing medication and patient would like to speak to Dr. Guillen at this time. She has an appointment on Friday.   -Patient is currently taking a break from work for mental health reasons. 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 She enjoys working there and states that she would like to be a teacher.   -Current medications include remeron 7.5 mg nightly for depression which is prescribed by Dr. Guillen. Patient reports moderate relief, denies side " "effects.     Male-to-female transgender person  Patient currently seeing Dr. Duenas. Currently taking Estrace 2 mg daily. Patient reports that she got some blood work done and plans on getting the rest done as soon as possible.     Allergies: Patient has no known allergies.    Current Outpatient Medications Ordered in Epic   Medication Sig Dispense Refill   • estradiol (ESTRACE) 2 MG Tab Take 1 tablet by mouth every day. 90 tablet 1   • spironolactone (ALDACTONE) 50 MG Tab Take 1 Tab by mouth every day. 90 Tab 3   • mirtazapine (REMERON) 15 MG Tab Take 7.5 mg by mouth every evening. Indications: Major Depressive Disorder       No current Epic-ordered facility-administered medications on file.       Past Medical History:   Diagnosis Date   • Anxiety and depression    • Attention deficit hyperactivity disorder (ADHD), combined type 12/9/2019   • Bowel habit changes     constipation   • Childhood absence epilepsy (HCC)     treated with \"generic depakote\" per mom   • Current moderate episode of major depressive disorder without prior episode (HCC)    • Grand mal     last was 2014   • History of epilepsy 2008    Patient was diagnosed at age 8 with petit mall seizures treated with Depakote now has a clean EEGsince 2015   • Juvenile idiopathic scoliosis of thoracic region    • Male-to-female transgender person 5/6/2020   • OCD (obsessive compulsive disorder)    • Pacemaker 2008   • Sinus pause 12/12/2008    Status post pacemaker placement.     Past Surgical History:   Procedure Laterality Date   • PACEMAKER INSERTION Left 12/12/2008    Medtronic Adapta ADDR01 implanted in [place], [place].   • TONSILLECTOMY AND ADENOIDECTOMY       Social History     Tobacco Use   • Smoking status: Never Smoker   • Smokeless tobacco: Never Used   Substance Use Topics   • Alcohol use: No   • Drug use: No       Family History   Problem Relation Age of Onset   • Psychiatric Illness Mother         bipolar and Kristen   • Alcohol/Drug Father         " "alcoholism   • Cancer Brother         leukemia   • Diabetes Maternal Grandmother      ROS:   Gen: no fevers/chills, no changes in weight  Eyes: no changes in vision  ENT: no sore throat, no hearing loss, no bloody nose  Pulm: no sob, no cough  CV: no chest pain, no palpitations  GI: no nausea/vomiting, no diarrhea  : no dysuria  MSk: no myalgias  Skin: no rash  Neuro: no headaches, no numbness/tingling  Heme/Lymph: no easy bruising        - NOTE: All other systems reviewed and are negative, except as in HPI.      Objective:     Exam: /64 (BP Location: Right arm, Patient Position: Sitting, BP Cuff Size: Adult)   Pulse 88   Temp 36.3 °C (97.3 °F)   Resp 14   Ht 1.702 m (5' 7\")   Wt 72.1 kg (159 lb)   SpO2 98%  Body mass index is 24.9 kg/m².    General: Normal appearing. No distress.  HEENT: Normocephalic. Eyes conjunctiva clear lids without ptosis, pupils equal and reactive to light accommodation, ears normal shape and contour, canals are clear bilaterally, tympanic membranes are benign, nasal mucosa benign, oropharynx is without erythema, edema or exudates.   Neck: Supple without JVD or bruit. Thyroid is not enlarged.  Pulmonary: Clear to ausculation.  Normal effort. No rales, ronchi, or wheezing.  Cardiovascular: Regular rate and rhythm without murmur. Carotid and radial pulses are intact and equal bilaterally.  Abdomen: Soft, nontender, nondistended. Normal bowel sounds. Liver and spleen are not palpable  Neurologic: Grossly nonfocal  Lymph: No cervical or supraclavicular lymph nodes are palpable  Skin: Warm and dry.  No obvious lesions.  Musculoskeletal: Normal gait. No extremity cyanosis, clubbing, or edema.  Psych: Normal mood and affect. Alert and oriented x3. Judgment and insight is normal.    Labs: Previously ordered.     Assessment & Plan:   20 y.o. adult with the following -    1. Attention deficit hyperactivity disorder (ADHD), combined type  EKG within normal limits for patient and " consistent with previous EKGs. Patient to continue following with Dr. Guillen. Continue to monitor.     2. Current moderate episode of major depressive disorder without prior episode (HCC)  Patient is feeling moderately well on current medications. Will continue. Discussed increasing or changing medications, patient declined at this time. Patient to speak to Dr. Guillen regarding fleeting thoughts of suicide on Friday. Emphasized importance of healthy diet and exercise.  Discussed that should the patient have any symptoms they should call suicide prevention hotline or report to the emergency room immediately.      3. Male-to-female transgender person  Continue to follow with Dr. Duenas. Labs as indicated. Continue medication as prescribed. Continue to monitor.     Return in about 6 months (around 11/24/2021).    Please note that this dictation was created using voice recognition software. I have made every reasonable attempt to correct obvious errors, but I expect that there are errors of grammar and possibly content that I did not discover before finalizing the note.

## 2021-05-24 NOTE — ASSESSMENT & PLAN NOTE
This is a chronic condition. Patient was diagnosed in 2019 by a psychiatrist. Currently seeing Dr. Guillen. Patient is hoping to get treated for her ADHD, but requires an EKG before medication can be started because of a childhood history of sick sinus syndrome. Patient had pacemaker removed about 3 year ago.     EKG Interpretation   Ordered and interpreted by JUAN Tyler  Rhythm: normal sinus   Rate: normal   Axis: normal   Ectopy: none   Conduction: normal   ST Segments: no acute change   T Waves: no acute change   Q Waves: none   Clinical Impression: no acute changes and normal EKG

## 2021-05-24 NOTE — ASSESSMENT & PLAN NOTE
Patient currently seeing Dr. Duenas. Currently taking Estrace 2 mg daily. Patient reports that she got some blood work done and plans on getting the rest done as soon as possible.

## 2021-05-24 NOTE — ASSESSMENT & PLAN NOTE
"-This is a chronic condition, new to me. Patient is currently seeing Dr. Guillen for management.   -PHQ-9 today is 10 and patient did describe fleeting and vague thoughts of suicide: \"I am just a mistake.\" Patient denies a plan, and states that she would never go through with a suicide attempt.   -Discussed increasing/changing medication and patient would like to speak to Dr. Guillen at this time. She has an appointment on Friday.   -Patient is currently taking a break from work for mental health reasons. 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 She enjoys working there and states that she would like to be a teacher.   -Current medications include remeron 7.5 mg nightly for depression which is prescribed by Dr. Guillen. Patient reports moderate relief, denies side effects.   "

## 2021-06-17 ENCOUNTER — HOSPITAL ENCOUNTER (OUTPATIENT)
Dept: LAB | Facility: MEDICAL CENTER | Age: 21
End: 2021-06-17
Attending: INTERNAL MEDICINE
Payer: COMMERCIAL

## 2021-06-17 DIAGNOSIS — Z78.9 MALE-TO-FEMALE TRANSGENDER PERSON: ICD-10-CM

## 2021-06-17 DIAGNOSIS — F32.0 CURRENT MILD EPISODE OF MAJOR DEPRESSIVE DISORDER, UNSPECIFIED WHETHER RECURRENT (HCC): ICD-10-CM

## 2021-06-17 DIAGNOSIS — Z79.899 HIGH RISK MEDICATION USE: ICD-10-CM

## 2021-06-17 LAB
ALBUMIN SERPL BCP-MCNC: 4.7 G/DL (ref 3.2–4.9)
ALBUMIN/GLOB SERPL: 1.6 G/DL
ALP SERPL-CCNC: 71 U/L (ref 30–99)
ALT SERPL-CCNC: 9 U/L (ref 2–50)
ANION GAP SERPL CALC-SCNC: 11 MMOL/L (ref 7–16)
AST SERPL-CCNC: 13 U/L (ref 12–45)
BILIRUB SERPL-MCNC: 0.4 MG/DL (ref 0.1–1.5)
BUN SERPL-MCNC: 20 MG/DL (ref 8–22)
CALCIUM SERPL-MCNC: 9.5 MG/DL (ref 8.5–10.5)
CHLORIDE SERPL-SCNC: 103 MMOL/L (ref 96–112)
CO2 SERPL-SCNC: 25 MMOL/L (ref 20–33)
CREAT SERPL-MCNC: 0.97 MG/DL (ref 0.5–1.4)
GLOBULIN SER CALC-MCNC: 2.9 G/DL (ref 1.9–3.5)
GLUCOSE SERPL-MCNC: 95 MG/DL (ref 65–99)
HCT VFR BLD AUTO: 47.4 % (ref 42–52)
HGB BLD-MCNC: 15.2 G/DL (ref 14–18)
POTASSIUM SERPL-SCNC: 4 MMOL/L (ref 3.6–5.5)
PROT SERPL-MCNC: 7.6 G/DL (ref 6–8.2)
SODIUM SERPL-SCNC: 139 MMOL/L (ref 135–145)

## 2021-06-17 PROCEDURE — 84443 ASSAY THYROID STIM HORMONE: CPT

## 2021-06-17 PROCEDURE — 84403 ASSAY OF TOTAL TESTOSTERONE: CPT

## 2021-06-17 PROCEDURE — 36415 COLL VENOUS BLD VENIPUNCTURE: CPT

## 2021-06-17 PROCEDURE — 84270 ASSAY OF SEX HORMONE GLOBUL: CPT

## 2021-06-17 PROCEDURE — 85018 HEMOGLOBIN: CPT

## 2021-06-17 PROCEDURE — 80053 COMPREHEN METABOLIC PANEL: CPT

## 2021-06-17 PROCEDURE — 84402 ASSAY OF FREE TESTOSTERONE: CPT

## 2021-06-17 PROCEDURE — 85014 HEMATOCRIT: CPT

## 2021-06-17 PROCEDURE — 82670 ASSAY OF TOTAL ESTRADIOL: CPT

## 2021-06-17 PROCEDURE — 84439 ASSAY OF FREE THYROXINE: CPT

## 2021-06-18 LAB
T4 FREE SERPL-MCNC: 1.22 NG/DL (ref 0.93–1.7)
TSH SERPL DL<=0.005 MIU/L-ACNC: 1.51 UIU/ML (ref 0.38–5.33)

## 2021-06-19 LAB
ESTRADIOL SERPL-MCNC: 133 PG/ML
SHBG SERPL-SCNC: 49 NMOL/L (ref 11–80)
TESTOST FREE MFR SERPL: 1.5 % (ref 1.6–2.9)
TESTOST FREE SERPL-MCNC: 76 PG/ML (ref 47–244)
TESTOST SERPL-MCNC: 505 NG/DL (ref 300–1080)

## 2021-08-06 DIAGNOSIS — Z79.899 HIGH RISK MEDICATION USE: ICD-10-CM

## 2021-08-06 DIAGNOSIS — Z78.9 MALE-TO-FEMALE TRANSGENDER PERSON: ICD-10-CM

## 2021-08-18 ENCOUNTER — OFFICE VISIT (OUTPATIENT)
Dept: ENDOCRINOLOGY | Facility: MEDICAL CENTER | Age: 21
End: 2021-08-18
Attending: INTERNAL MEDICINE
Payer: COMMERCIAL

## 2021-08-18 VITALS
HEIGHT: 67 IN | OXYGEN SATURATION: 98 % | SYSTOLIC BLOOD PRESSURE: 110 MMHG | BODY MASS INDEX: 23.65 KG/M2 | HEART RATE: 83 BPM | DIASTOLIC BLOOD PRESSURE: 62 MMHG | WEIGHT: 150.7 LBS

## 2021-08-18 DIAGNOSIS — E55.9 VITAMIN D DEFICIENCY: ICD-10-CM

## 2021-08-18 DIAGNOSIS — Z78.9 MALE-TO-FEMALE TRANSGENDER PERSON: ICD-10-CM

## 2021-08-18 DIAGNOSIS — Z79.899 HIGH RISK MEDICATION USE: ICD-10-CM

## 2021-08-18 LAB
ALBUMIN SERPL-MCNC: 4.9 G/DL (ref 4.1–5.2)
ALBUMIN/GLOB SERPL: 1.8 {RATIO} (ref 1.2–2.2)
ALP SERPL-CCNC: 74 IU/L (ref 48–121)
ALT SERPL-CCNC: 11 IU/L (ref 0–44)
AST SERPL-CCNC: 19 IU/L (ref 0–40)
BILIRUB SERPL-MCNC: 0.5 MG/DL (ref 0–1.2)
BUN SERPL-MCNC: 14 MG/DL (ref 6–20)
BUN/CREAT SERPL: 14 (ref 9–20)
CALCIUM SERPL-MCNC: 9.8 MG/DL (ref 8.7–10.2)
CHLORIDE SERPL-SCNC: 101 MMOL/L (ref 96–106)
CO2 SERPL-SCNC: 22 MMOL/L (ref 20–29)
CREAT SERPL-MCNC: 0.99 MG/DL (ref 0.76–1.27)
ESTRADIOL SERPL-MCNC: 68.6 PG/ML (ref 7.6–42.6)
GLOBULIN SER CALC-MCNC: 2.8 G/DL (ref 1.5–4.5)
GLUCOSE SERPL-MCNC: 97 MG/DL (ref 65–99)
POTASSIUM SERPL-SCNC: 4 MMOL/L (ref 3.5–5.2)
PROT SERPL-MCNC: 7.7 G/DL (ref 6–8.5)
SODIUM SERPL-SCNC: 139 MMOL/L (ref 134–144)
TESTOST FREE SERPL-MCNC: 9.4 PG/ML (ref 9.3–26.5)
TESTOST SERPL-MCNC: 629 NG/DL (ref 264–916)

## 2021-08-18 PROCEDURE — 99211 OFF/OP EST MAY X REQ PHY/QHP: CPT | Performed by: INTERNAL MEDICINE

## 2021-08-18 PROCEDURE — 99214 OFFICE O/P EST MOD 30 MIN: CPT | Performed by: INTERNAL MEDICINE

## 2021-08-18 RX ORDER — METHYLPHENIDATE HYDROCHLORIDE 20 MG/1
TABLET ORAL
COMMUNITY
Start: 2021-08-05 | End: 2022-05-11

## 2021-08-18 RX ORDER — ESTRADIOL 2 MG/1
2 TABLET ORAL DAILY
Qty: 90 TABLET | Refills: 2 | Status: SHIPPED | OUTPATIENT
Start: 2021-08-18 | End: 2022-05-11 | Stop reason: SDUPTHER

## 2021-08-18 RX ORDER — METHYLPHENIDATE HYDROCHLORIDE 10 MG/1
TABLET ORAL
COMMUNITY
Start: 2021-06-24 | End: 2021-08-18

## 2021-08-18 RX ORDER — MIRTAZAPINE 30 MG/1
TABLET, FILM COATED ORAL
COMMUNITY
Start: 2021-07-29 | End: 2022-05-11

## 2021-08-18 RX ORDER — SPIRONOLACTONE 50 MG/1
50 TABLET, FILM COATED ORAL DAILY
Qty: 90 TABLET | Refills: 3 | Status: SHIPPED | OUTPATIENT
Start: 2021-08-18 | End: 2021-08-19 | Stop reason: SDUPTHER

## 2021-08-18 ASSESSMENT — FIBROSIS 4 INDEX: FIB4 SCORE: 0.35

## 2021-08-18 NOTE — PROGRESS NOTES
Chief Complaint: Follow up for  Gender Dysphoria, Male to Female Transgender.      HPI:     Chris Larios is a 20 y.o. adult here for follow up of gender dysphoria and male to female transgender    She was previously seen by another endocrinologist   Comorbid conditions include ADHD and depression  He has been on gender affirming therapy for at least 6 to 12 months now    Last visit we increased her estradiol from 1 mg to 2 mg daily because her estradiol levels were still low at 29    She is currently taking estradiol 2 mg daily and spironolactone 50 mg daily.     Last labs on June 17, 2021 show improvement in estrogen levels to 133 but her total testosterone was still elevated at 505    Her potassium was normal at 4.0 on June 2021    She just completed labs last week at [facility] to check her testosterone levels but they are still pending    Otherwise she feels okay she denies depression.  She still has some facial hair, she denies having issues with acne and oiliness in her skin.  She also has problems with her voice as it is still deep.      Patient's medications, allergies, and social histories were reviewed and updated as appropriate.      ROS:       CONS:     No fever, no chills   EYES:     No diplopia, no blurry vision   CV:           No chest pain, no palpitations   PULM:     No SOB, no cough, no hemoptysis.   GI:            No nausea, no vomiting, no diarrhea, no constipation   ENDO:     No polyuria, no polydipsia, no heat intolerance, no cold intolerance     Past Medical History:  Problem List:  2021-01: High risk medication use  2020-05: Male-to-female transgender person  2019-12: Attention deficit hyperactivity disorder (ADHD), combined   type  2018-08: Sick sinus syndrome (HCC)  2018-06: Cardiac pacemaker in situ  2018-06: History of epilepsy  2018-06: Current moderate episode of major depressive disorder   without prior episode (HCC)  2018-06: Juvenile idiopathic scoliosis of thoracic  "region      Past Surgical History:  Past Surgical History:   Procedure Laterality Date   • PACEMAKER INSERTION Left 12/12/2008    Medtronic Adapta [id number] implanted in [place], [place].   • TONSILLECTOMY AND ADENOIDECTOMY          Allergies:  Patient has no known allergies.     Social History:  Social History     Tobacco Use   • Smoking status: Never Smoker   • Smokeless tobacco: Never Used   Vaping Use   • Vaping Use: Never used   Substance Use Topics   • Alcohol use: No   • Drug use: No        Family History:   family history includes Alcohol/Drug in her father; Cancer in her brother; Diabetes in her maternal grandmother; Psychiatric Illness in her mother.      PHYSICAL EXAM:   Vital signs: /62 (BP Location: Left arm, Patient Position: Sitting, BP Cuff Size: Adult)   Pulse 83   Ht 1.702 m (5' 7\")   Wt 68.4 kg (150 lb 11.2 oz)   SpO2 98%   BMI 23.60 kg/m²   GENERAL: Well-developed, well-nourished in no apparent distress.   EYE:  No ocular asymmetry, PERRLA  HENT: Pink, moist mucous membranes.    NECK: No thyromegaly.   CARDIOVASCULAR:  No murmurs  LUNGS: Clear breath sounds  ABDOMEN: Soft, nontender   EXTREMITIES: No clubbing, cyanosis, or edema.   NEUROLOGICAL: No gross focal motor abnormalities   LYMPH: No cervical adenopathy seen   SKIN: No rashes, lesions.       Labs:  Lab Results   Component Value Date/Time    WBC 7.0 05/12/2021 09:02 AM    RBC 5.21 05/12/2021 09:02 AM    HEMOGLOBIN 15.2 06/17/2021 09:44 AM    MCV 92.9 05/12/2021 09:02 AM    MCH 29.2 05/12/2021 09:02 AM    MCHC 31.4 (L) 05/12/2021 09:02 AM    RDW 42.6 05/12/2021 09:02 AM    MPV 10.5 05/12/2021 09:02 AM       Lab Results   Component Value Date/Time    SODIUM 139 06/17/2021 09:44 AM    POTASSIUM 4.0 06/17/2021 09:44 AM    CHLORIDE 103 06/17/2021 09:44 AM    CO2 25 06/17/2021 09:44 AM    ANION 11.0 06/17/2021 09:44 AM    GLUCOSE 95 06/17/2021 09:44 AM    BUN 20 06/17/2021 09:44 AM    CREATININE 0.97 06/17/2021 09:44 AM    CREATININE 0.6 " 11/04/2008 08:05 AM    CALCIUM 9.5 06/17/2021 09:44 AM    ASTSGOT 13 06/17/2021 09:44 AM    ALTSGPT 9 06/17/2021 09:44 AM    TBILIRUBIN 0.4 06/17/2021 09:44 AM    ALBUMIN 4.7 06/17/2021 09:44 AM    TOTPROTEIN 7.6 06/17/2021 09:44 AM    GLOBULIN 2.9 06/17/2021 09:44 AM    AGRATIO 1.6 06/17/2021 09:44 AM       Lab Results   Component Value Date/Time    TSHULTRASEN 1.630 01/10/2020 0826     No results found for: FREEDIR  No results found for: FREET3  No results found for: THYSTIMIG      Imaging:    ASSESSMENT/PLAN:     1. Male-to-female transgender person  Stable  I will await for the test results from [facility] to arrive  For now continue estradiol 2 mg daily  We may need to go up on her spironolactone  Explained to patient the rationale for lab work with gender affirming therapy  Goal is to keep testosterone less than 50  Goal is to maintain estradiol levels at physiologic levels  We also need to monitor potassium because spironolactone is a high risk medication  I will see her again in 6 months with repeat of her labs for estradiol, testosterone, SHBG, free testosterone, serum creatinine and potassium levels  I am also going to do some research if there is an ATH certified physician who can help with voice rehabilitation    2. High risk medication use  Patient is taking estrogen and spironolactone which are high risk medications    3. Current mild episode of major depressive disorder, unspecified whether recurrent (HCC)  Stable continue mirtazapine continue follow-up with psychiatry      Return in about 6 months (around 2/18/2022).      Thank you kindly for allowing me to participate in the endocrine care plan for this patient.    Sekou Duenas MD, FACE, [facility]  01/05/21    CC:   Cherie Brian M.D.

## 2021-08-19 DIAGNOSIS — Z79.899 HIGH RISK MEDICATION USE: ICD-10-CM

## 2021-08-19 RX ORDER — SPIRONOLACTONE 50 MG/1
50 TABLET, FILM COATED ORAL 2 TIMES DAILY
Qty: 180 TABLET | Refills: 3 | Status: SHIPPED | OUTPATIENT
Start: 2021-08-19 | End: 2021-10-01 | Stop reason: SDUPTHER

## 2021-10-01 DIAGNOSIS — Z79.899 HIGH RISK MEDICATION USE: ICD-10-CM

## 2021-10-01 RX ORDER — SPIRONOLACTONE 50 MG/1
50 TABLET, FILM COATED ORAL 2 TIMES DAILY
Qty: 180 TABLET | Refills: 3 | Status: SHIPPED | OUTPATIENT
Start: 2021-10-01 | End: 2022-05-11 | Stop reason: SDUPTHER

## 2021-10-01 NOTE — ADDENDUM NOTE
38w6d feeling good, no c/o.  Good fm.  Reports BPP 8/8 and growth at 7lb 9oz today.  IOL scheduled for tomorrow, cervix closed, discussed ripening meds/process, typical timeline.   hoping to help catch baby if safe to do so, discussed. COVID test done and negative  jlp   Addended by: BOSTON HAYES on: 10/1/2021 11:05 AM     Modules accepted: Orders

## 2021-10-01 NOTE — TELEPHONE ENCOUNTER
Patients mom called back.     States they are sick of using [facility] because they always have problems with them and this last time they are only giving enough for one pill a day,       States they would like to switch from [facility] to [facility].     Please advise.       Received request via: Patient    Was the patient seen in the last year in this department? Yes    Does the patient have an active prescription (recently filled or refills available) for medication(s) requested? No       REQUESTED MEDICATION:   Requested Prescriptions     Pending Prescriptions Disp Refills   • spironolactone (ALDACTONE) 50 MG Tab 180 Tablet 3     Sig: Take 1 Tablet by mouth 2 times a day.

## 2021-10-01 NOTE — TELEPHONE ENCOUNTER
PT's mom called to get clarificaion on meds as PT was trying to get a refill but Rx was denying it as they are saying it's too soon. She asked what the notes for the prescription was and when it was sent. I let her know we sent it on 08/19 and that it said take 1 tab by mouth 2 times a day. PT's mom said they would double check and call back if further clarification is needed

## 2021-11-05 ENCOUNTER — TELEMEDICINE (OUTPATIENT)
Dept: ENDOCRINOLOGY | Facility: MEDICAL CENTER | Age: 21
End: 2021-11-05
Attending: INTERNAL MEDICINE
Payer: COMMERCIAL

## 2021-11-05 DIAGNOSIS — F32.0 CURRENT MILD EPISODE OF MAJOR DEPRESSIVE DISORDER, UNSPECIFIED WHETHER RECURRENT (HCC): ICD-10-CM

## 2021-11-05 DIAGNOSIS — E55.9 VITAMIN D DEFICIENCY: ICD-10-CM

## 2021-11-05 DIAGNOSIS — Z79.899 HIGH RISK MEDICATION USE: ICD-10-CM

## 2021-11-05 DIAGNOSIS — Z78.9 MALE-TO-FEMALE TRANSGENDER PERSON: ICD-10-CM

## 2021-11-05 PROBLEM — F32.A DEPRESSION, UNSPECIFIED: Status: ACTIVE | Noted: 2017-02-07

## 2021-11-05 PROCEDURE — 99214 OFFICE O/P EST MOD 30 MIN: CPT | Mod: 95 | Performed by: INTERNAL MEDICINE

## 2021-11-05 NOTE — PROGRESS NOTES
Chief Complaint: Follow up for  Gender Dysphoria, Male to Female Transgender.  Patient was presented for a telehealth consultation via secure and encrypted videoconferencing technology. This encounter was conducted via Zoom . Verbal consent was obtained. Patient's identity was verified.    HPI:     Chris Larios is a 20 y.o. adult here for follow up of gender dysphoria and male to female transgender    She was previously seen by another endocrinologist   Comorbid conditions include ADHD and depression  He has been on gender affirming therapy for at least 6 to 12 months now      She is currently taking estradiol 2 mg daily and spironolactone 50 mg twice daily.       Currently her labs show an estrogen of 68.6 testosterone of 629 calcium 9.8 potassium of 4.0      She reports that she is having doubts and is questioning as to whether she wants to pursue gender affirming therapy. She reports that she feels confused. She denies severe depression and suicidal thoughts. She has already seen her therapist and she has another appointment in 2 weeks.          Patient's medications, allergies, and social histories were reviewed and updated as appropriate.      ROS:       CONS:     No fever, no chills   EYES:     No diplopia, no blurry vision   CV:           No chest pain, no palpitations   PULM:     No SOB, no cough, no hemoptysis.   GI:            No nausea, no vomiting, no diarrhea, no constipation   ENDO:     No polyuria, no polydipsia, no heat intolerance, no cold intolerance     Past Medical History:  Problem List:  2021-01: High risk medication use  2020-05: Male-to-female transgender person  2019-12: Attention deficit hyperactivity disorder (ADHD), combined   type  2018-08: Sick sinus syndrome (HCC)  2018-06: Cardiac pacemaker in situ  2018-06: History of epilepsy  2018-06: Current moderate episode of major depressive disorder   without prior episode (HCC)  2018-06: Juvenile idiopathic scoliosis of thoracic  region      Past Surgical History:  Past Surgical History:   Procedure Laterality Date   • PACEMAKER INSERTION Left 12/12/2008    Medtronic Adapta ADDR01 implanted in [place], [place].   • TONSILLECTOMY AND ADENOIDECTOMY          Allergies:  Patient has no known allergies.     Social History:  Social History     Tobacco Use   • Smoking status: Never Smoker   • Smokeless tobacco: Never Used   Vaping Use   • Vaping Use: Never used   Substance Use Topics   • Alcohol use: No   • Drug use: No        Family History:   family history includes Alcohol/Drug in her father; Cancer in her brother; Diabetes in her maternal grandmother; Psychiatric Illness in her mother.      PHYSICAL EXAM:   Vital signs: There were no vitals taken for this visit.  GENERAL: Well-developed, well-nourished in no apparent distress.   EYE:  No ocular asymmetry, PERRLA  HENT: Pink, moist mucous membranes.    NECK: No thyromegaly.   CARDIOVASCULAR:  No murmurs  LUNGS: Clear breath sounds  ABDOMEN: Soft, nontender   EXTREMITIES: No clubbing, cyanosis, or edema.   NEUROLOGICAL: No gross focal motor abnormalities   LYMPH: No cervical adenopathy seen   SKIN: No rashes, lesions.       Labs:  Lab Results   Component Value Date/Time    WBC 7.0 05/12/2021 09:02 AM    RBC 5.21 05/12/2021 09:02 AM    HEMOGLOBIN 15.2 06/17/2021 09:44 AM    MCV 92.9 05/12/2021 09:02 AM    MCH 29.2 05/12/2021 09:02 AM    MCHC 31.4 (L) 05/12/2021 09:02 AM    RDW 42.6 05/12/2021 09:02 AM    MPV 10.5 05/12/2021 09:02 AM       Lab Results   Component Value Date/Time    SODIUM 139 08/12/2021 06:57 AM    SODIUM 139 06/17/2021 09:44 AM    POTASSIUM 4.0 08/12/2021 06:57 AM    POTASSIUM 4.0 06/17/2021 09:44 AM    CHLORIDE 101 08/12/2021 06:57 AM    CHLORIDE 103 06/17/2021 09:44 AM    CO2 22 08/12/2021 06:57 AM    CO2 25 06/17/2021 09:44 AM    ANION 11.0 06/17/2021 09:44 AM    GLUCOSE 97 08/12/2021 06:57 AM    GLUCOSE 95 06/17/2021 09:44 AM    BUN 14 08/12/2021 06:57 AM    BUN 20 06/17/2021  09:44 AM    CREATININE 0.99 08/12/2021 06:57 AM    CREATININE 0.97 06/17/2021 09:44 AM    CREATININE 0.6 11/04/2008 08:05 AM    CALCIUM 9.8 08/12/2021 06:57 AM    CALCIUM 9.5 06/17/2021 09:44 AM    ASTSGOT 19 08/12/2021 06:57 AM    ASTSGOT 13 06/17/2021 09:44 AM    ALTSGPT 11 08/12/2021 06:57 AM    ALTSGPT 9 06/17/2021 09:44 AM    TBILIRUBIN 0.5 08/12/2021 06:57 AM    TBILIRUBIN 0.4 06/17/2021 09:44 AM    ALBUMIN 4.9 08/12/2021 06:57 AM    ALBUMIN 4.7 06/17/2021 09:44 AM    TOTPROTEIN 7.7 08/12/2021 06:57 AM    TOTPROTEIN 7.6 06/17/2021 09:44 AM    GLOBULIN 2.8 08/12/2021 06:57 AM    GLOBULIN 2.9 06/17/2021 09:44 AM    AGRATIO 1.8 08/12/2021 06:57 AM    AGRATIO 1.6 06/17/2021 09:44 AM       Lab Results   Component Value Date/Time    TSHULTNELLIEN 1.630 01/10/2020 0826     No results found for: FREEDIR  No results found for: FREET3  No results found for: THYSTIMIG      Imaging:    ASSESSMENT/PLAN:     1. Male-to-female transgender person  Unstable  Estrogen levels are not at goal and testosterone levels are still high however the patient is also confused about gender affirming therapy and does not want to pursue treatment at this time until she is ready  I recommend she discontinue spironolactone and estradiol  I want her to continue follow-up with her therapist and I want her to let us know once she is ready again to move forward transitioning  I think it is important for her behavioral health to be settled and that she needs to be mentally and emotionally prepared for gender affirming therapy  I want her to follow-up in 6 months    2. High risk medication use  Patient is taking estrogen and spironolactone which are high risk medications    3. Depression  unstable continue mirtazapine continue follow-up with psychiatry      Return in about 6 months (around 5/5/2022).      Thank you kindly for allowing me to participate in the endocrine care plan for this patient.    Sekou Duenas MD, R[place], [facility]  01/05/21    CC:    Cherie Brian M.D.

## 2021-11-09 ENCOUNTER — HOSPITAL ENCOUNTER (OUTPATIENT)
Dept: BEHAVIORAL HEALTH | Facility: MEDICAL CENTER | Age: 21
End: 2021-11-09
Attending: PSYCHIATRY & NEUROLOGY
Payer: COMMERCIAL

## 2021-11-09 DIAGNOSIS — F32.1 CURRENT MODERATE EPISODE OF MAJOR DEPRESSIVE DISORDER WITHOUT PRIOR EPISODE (HCC): ICD-10-CM

## 2021-11-09 DIAGNOSIS — F90.2 ATTENTION DEFICIT HYPERACTIVITY DISORDER (ADHD), COMBINED TYPE: ICD-10-CM

## 2021-11-09 PROCEDURE — 90791 PSYCH DIAGNOSTIC EVALUATION: CPT

## 2021-11-09 ASSESSMENT — ANXIETY QUESTIONNAIRES
GAD7 TOTAL SCORE: 17
1. FEELING NERVOUS, ANXIOUS, OR ON EDGE: MORE THAN HALF THE DAYS
5. BEING SO RESTLESS THAT IT IS HARD TO SIT STILL: MORE THAN HALF THE DAYS
6. BECOMING EASILY ANNOYED OR IRRITABLE: NEARLY EVERY DAY
3. WORRYING TOO MUCH ABOUT DIFFERENT THINGS: NEARLY EVERY DAY
4. TROUBLE RELAXING: SEVERAL DAYS
7. FEELING AFRAID AS IF SOMETHING AWFUL MIGHT HAPPEN: NEARLY EVERY DAY
IF YOU CHECKED OFF ANY PROBLEMS ON THIS QUESTIONNAIRE, HOW DIFFICULT HAVE THESE PROBLEMS MADE IT FOR YOU TO DO YOUR WORK, TAKE CARE OF THINGS AT HOME, OR GET ALONG WITH OTHER PEOPLE: VERY DIFFICULT
2. NOT BEING ABLE TO STOP OR CONTROL WORRYING: NEARLY EVERY DAY

## 2021-11-09 ASSESSMENT — PATIENT HEALTH QUESTIONNAIRE - PHQ9
SUM OF ALL RESPONSES TO PHQ QUESTIONS 1-9: 15
CLINICAL INTERPRETATION OF PHQ2 SCORE: 4
5. POOR APPETITE OR OVEREATING: 2 - MORE THAN HALF THE DAYS
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
## 2022-02-18 ENCOUNTER — OFFICE VISIT (OUTPATIENT)
Dept: URGENT CARE | Facility: CLINIC | Age: 22
End: 2022-02-18
Payer: COMMERCIAL

## 2022-02-18 ENCOUNTER — HOSPITAL ENCOUNTER (OUTPATIENT)
Facility: MEDICAL CENTER | Age: 22
End: 2022-02-18
Attending: EMERGENCY MEDICINE
Payer: COMMERCIAL

## 2022-02-18 VITALS
TEMPERATURE: 98.5 F | HEIGHT: 68 IN | HEART RATE: 110 BPM | OXYGEN SATURATION: 96 % | RESPIRATION RATE: 16 BRPM | SYSTOLIC BLOOD PRESSURE: 120 MMHG | WEIGHT: 140 LBS | DIASTOLIC BLOOD PRESSURE: 80 MMHG | BODY MASS INDEX: 21.22 KG/M2

## 2022-02-18 DIAGNOSIS — Z91.89 AT RISK FOR SEXUALLY TRANSMITTED DISEASE DUE TO UNPROTECTED SEX: ICD-10-CM

## 2022-02-18 DIAGNOSIS — Z11.3 SCREENING FOR STD (SEXUALLY TRANSMITTED DISEASE): ICD-10-CM

## 2022-02-18 PROCEDURE — 87591 N.GONORRHOEAE DNA AMP PROB: CPT

## 2022-02-18 PROCEDURE — 99214 OFFICE O/P EST MOD 30 MIN: CPT | Performed by: EMERGENCY MEDICINE

## 2022-02-18 PROCEDURE — 87491 CHLMYD TRACH DNA AMP PROBE: CPT

## 2022-02-18 RX ORDER — EMTRICITABINE AND TENOFOVIR DISOPROXIL FUMARATE 200; 300 MG/1; MG/1
1 TABLET, FILM COATED ORAL DAILY
Qty: 3 TABLET | Refills: 0 | Status: SHIPPED | OUTPATIENT
Start: 2022-02-18 | End: 2022-02-21

## 2022-02-18 RX ORDER — FLUOXETINE HYDROCHLORIDE 40 MG/1
CAPSULE ORAL
COMMUNITY
Start: 2022-01-22 | End: 2024-02-23 | Stop reason: SDUPTHER

## 2022-02-18 ASSESSMENT — ENCOUNTER SYMPTOMS: FEVER: 0

## 2022-02-18 ASSESSMENT — FIBROSIS 4 INDEX: FIB4 SCORE: 0.46

## 2022-02-18 NOTE — PATIENT INSTRUCTIONS
Preventing HIV Infection and AIDS  HIV (human immunodeficiency virus) infection is a long-term (chronic) viral infection. HIV kills white blood cells that help to control the body's defense (immune) system and fight infection. HIV spreads through semen, blood, breast milk, rectal fluid, and vaginal fluid. HIV is commonly spread through sexual contact and sharing needles or syringes, because these behaviors involve exchanging bodily fluids. Without treatment, HIV can turn into AIDS (acquired immunodeficiency syndrome), which is an advanced stage of HIV infection. AIDS is a very serious illness and can be life-threatening.  What changes can I make to protect myself from HIV infection?  Sexual contact  To protect yourself from HIV through sexual contact:  · Use devices that prevent body fluids from passing between partners (barrier protection) every time you have sex. Barrier protection can be used during oral, vaginal, or anal sex. Commonly used barrier methods include:  ? Male condom.  ? Female condom.  ? Dental dam.  · If you are at risk, ask your health care provider about taking medicine that can prevent HIV infection (pre-exposure prophylaxis, PrEP).  · Get tested for HIV and know the HIV status of your sexual partner(s). Avoid having sex with partners without a known HIV status. If you or your partner is HIV-positive, use protection during sex.  · Practice monogamy. This means you have only one sexual partner in your lifetime or only one partner at a time (serial monogamy).  · Get tested and treated for STIs (sexually transmitted infections). Having an STI increases your risk for getting HIV.  The only way to completely prevent HIV from being spread through sexual contact is not to have any kind of sex (abstinence), including oral, vaginal, or anal sex.  Drug use  To protect yourself from HIV through drug use:  · Do not use drugs, especially drugs that are injected.  · Avoid having sex while under the influence  of alcohol and drugs. Alcohol and drugs can affect your ability to make good decisions and may lead you to engage in high risk behaviors.  · Do not share needles or syringes with anyone else. If you do share needles or syringes, consider taking PrEP to prevent HIV infection.  Blood and bodily fluid  To protect yourself from HIV through exposure to blood and bodily fluids from a person who has HIV:  · Cover any sores or wounds on yourself or the person with HIV.  · If you need to touch blood or bodily fluids from an infected person, use gloves and wash your hands afterward.  · Do not share items that touch bodily fluids or blood, such as toothbrushes or razors.  What can happen if I do not make these changes?  If you do not make these changes:  · You put yourself at risk of getting HIV from an infected person. HIV is a serious, life-threatening illness that cannot be cured. Having HIV makes it easier to get sick and more difficult to get well.  · You can pass HIV on to others even if you don't know that you have it. An infected mother can also pass it to her children through pregnancy, childbirth, or breastfeeding.  · You expose yourself to complications from the virus. Without treatment, the virus progresses. As it multiplies in your body, it causes the immune system to stop protecting you from infections and other health problems. You may get infections that you would not normally get if your immune system was healthy and working properly (opportunistic diseases).  · You put yourself at risk of side effects from HIV medicines. HIV medicines (antiretroviral therapy, ART) can help slow the virus from progressing and prevent its spread to others. People with HIV must take these medicines on a daily basis in order to live long, healthy lives. However, these medicines have side effects. Long-term use of ART medicines can lead to chronic health conditions, such as damage to the liver and kidneys, diabetes, and heart  disease. People who take HIV medicines must use protection during sex because they can still pass the virus on to sexual partners.  · You could also put yourself at high risk for getting other sexually transmitted infections.  · You put yourself at risk of having an unintended pregnancy.  Where to find support  To get support preventing HIV infection and AIDS:  · Talk with your health care provider.  · Visit your local health department or clinic.  · Consider joining a support group.  Where to find more information  Learn more about HIV and AIDS from:  · U.S. Department of Health and Human Services: www.aids.gov  · Centers for Disease Control and Prevention:  ? More information about preventing HIV: www.cdc.gov/hiv/basics/prevention.html  ? How to find a location where you can get sexual health materials and treatment for free or for a low cost: gettested.cdc.gov  Summary  · HIV spreads through semen, blood, breast milk, rectal fluid, and vaginal fluid.  · HIV is commonly spread through sexual contact and sharing needles or syringes, because these behaviors lead to an exchange of bodily fluids.  · To protect yourself from HIV through sexual contact, use a barrier protection method every time you have sex.  · Avoid having sex while under the influence of alcohol and drugs. These substances may lead you to engage in high risk behaviors.  · Get tested for HIV and make sure your sexual partner(s) get tested too.  This information is not intended to replace advice given to you by your health care provider. Make sure you discuss any questions you have with your health care provider.  Document Released: 12/05/2017 Document Revised: 04/10/2020 Document Reviewed: 12/05/2017  Elsevier Patient Education © 2020 [employer]

## 2022-02-18 NOTE — PROGRESS NOTES
"Subjective     Cori Larios is a 21 y.o. adult who presents with Exposure to STD (Std check)            Exposure to STD  This is a new problem. Episode onset: 2 days. Pertinent negatives include no fever, rash or urinary symptoms.   Notes oral, anal receptive and penile insertion sexual activity.  Request PrEP.  On hold with sexual transition therapy.  Review of Systems   Constitutional: Negative for fever.   Skin: Negative for rash.              Objective     /80 (BP Location: Left arm, Patient Position: Sitting, BP Cuff Size: Adult)   Pulse (!) 110   Temp 36.9 °C (98.5 °F) (Temporal)   Resp 16   Ht 1.727 m (5' 8\")   Wt 63.5 kg (140 lb)   SpO2 96%   BMI 21.29 kg/m²      Physical Exam  Constitutional:       Appearance: Normal appearance.   Genitourinary:     Penis: Normal and circumcised. No discharge or lesions.       Testes: Normal.      Epididymis:      Right: Normal.      Left: Normal.      Rectum: Normal.   Lymphadenopathy:      Lower Body: No right inguinal adenopathy. No left inguinal adenopathy.   Skin:     Findings: No rash.   Neurological:      Mental Status: She is alert.   Psychiatric:         Behavior: Behavior is cooperative.                             Assessment & Plan        1. At risk for sexually transmitted disease due to unprotected sex  - HIV AG/AB COMBO ASSAY SCREENING; Future  - CREATININE; Future  - HBCAB+HBSAB+AG  Pending negative/normal testing:  - emtricitabine-tenofovir (TRUVADA) 200-300 MG per tablet; Take 1 Tablet by mouth every day for 3 days.  Dispense: 3 Tablet; Refill: 0  REF PCP  2. Screening for STD (sexually transmitted disease)  - RPR (SYPHILIS); in 2 weeks  - CHLAMYDIA/GC PCR URINE OR SWAB; urine  - Chlamydia & N. Gonorrhoeae By PCR; oral  - Chlamydia & N. Gonorrhoeae By PCR; anal                "

## 2022-02-19 DIAGNOSIS — Z11.3 SCREENING FOR STD (SEXUALLY TRANSMITTED DISEASE): ICD-10-CM

## 2022-02-19 LAB
C TRACH DNA SPEC QL NAA+PROBE: NEGATIVE
N GONORRHOEA DNA SPEC QL NAA+PROBE: NEGATIVE
SPECIMEN SOURCE: NORMAL

## 2022-02-22 ENCOUNTER — TELEPHONE (OUTPATIENT)
Dept: URGENT CARE | Facility: CLINIC | Age: 22
End: 2022-02-22
Payer: COMMERCIAL

## 2022-02-22 LAB — CANCELLED TEST NOTIFICATION CANTE: NORMAL

## 2022-02-22 NOTE — TELEPHONE ENCOUNTER
The collection of the 2 GC&C swabs need to be re-collected. There was a swab for anal and mouth but neither swab was labeled where it came from so they need to be recollected.    Please advise.   Thank you.

## 2022-02-22 NOTE — RESULT ENCOUNTER NOTE
I called the patient, left a message, and asked the patient to call [facility] back.  Rosina

## 2022-05-09 ENCOUNTER — APPOINTMENT (OUTPATIENT)
Dept: LAB | Facility: MEDICAL CENTER | Age: 22
End: 2022-05-09
Payer: COMMERCIAL

## 2022-05-11 ENCOUNTER — OFFICE VISIT (OUTPATIENT)
Dept: ENDOCRINOLOGY | Facility: MEDICAL CENTER | Age: 22
End: 2022-05-11
Attending: INTERNAL MEDICINE
Payer: COMMERCIAL

## 2022-05-11 VITALS
OXYGEN SATURATION: 97 % | WEIGHT: 133 LBS | DIASTOLIC BLOOD PRESSURE: 60 MMHG | HEART RATE: 98 BPM | BODY MASS INDEX: 20.16 KG/M2 | SYSTOLIC BLOOD PRESSURE: 106 MMHG | HEIGHT: 68 IN

## 2022-05-11 DIAGNOSIS — Z78.9 MALE-TO-FEMALE TRANSGENDER PERSON: ICD-10-CM

## 2022-05-11 DIAGNOSIS — E55.9 VITAMIN D DEFICIENCY: ICD-10-CM

## 2022-05-11 DIAGNOSIS — Z79.899 HIGH RISK MEDICATION USE: ICD-10-CM

## 2022-05-11 PROCEDURE — 99214 OFFICE O/P EST MOD 30 MIN: CPT | Performed by: INTERNAL MEDICINE

## 2022-05-11 RX ORDER — SPIRONOLACTONE 50 MG/1
50 TABLET, FILM COATED ORAL 2 TIMES DAILY
Qty: 180 TABLET | Refills: 3 | Status: SHIPPED | OUTPATIENT
Start: 2022-05-11 | End: 2022-09-15 | Stop reason: SDUPTHER

## 2022-05-11 RX ORDER — ESTRADIOL 2 MG/1
2 TABLET ORAL DAILY
Qty: 90 TABLET | Refills: 2 | Status: SHIPPED | OUTPATIENT
Start: 2022-05-11 | End: 2022-09-15 | Stop reason: SDUPTHER

## 2022-05-11 RX ORDER — DEXTROAMPHETAMINE SACCHARATE, AMPHETAMINE ASPARTATE, DEXTROAMPHETAMINE SULFATE AND AMPHETAMINE SULFATE 2.5; 2.5; 2.5; 2.5 MG/1; MG/1; MG/1; MG/1
TABLET ORAL
COMMUNITY
Start: 2022-05-10 | End: 2024-02-23

## 2022-05-11 RX ORDER — DEXTROAMPHETAMINE SACCHARATE, AMPHETAMINE ASPARTATE, DEXTROAMPHETAMINE SULFATE AND AMPHETAMINE SULFATE 1.25; 1.25; 1.25; 1.25 MG/1; MG/1; MG/1; MG/1
TABLET ORAL
COMMUNITY
Start: 2022-03-15 | End: 2024-02-23

## 2022-05-11 ASSESSMENT — PATIENT HEALTH QUESTIONNAIRE - PHQ9: CLINICAL INTERPRETATION OF PHQ2 SCORE: 0

## 2022-05-11 ASSESSMENT — FIBROSIS 4 INDEX: FIB4 SCORE: 0.46

## 2022-05-12 ENCOUNTER — HOSPITAL ENCOUNTER (OUTPATIENT)
Dept: LAB | Facility: MEDICAL CENTER | Age: 22
End: 2022-05-12
Attending: INTERNAL MEDICINE
Payer: COMMERCIAL

## 2022-05-12 DIAGNOSIS — Z78.9 MALE-TO-FEMALE TRANSGENDER PERSON: ICD-10-CM

## 2022-05-12 DIAGNOSIS — Z79.899 HIGH RISK MEDICATION USE: ICD-10-CM

## 2022-05-12 DIAGNOSIS — E55.9 VITAMIN D DEFICIENCY: ICD-10-CM

## 2022-05-12 LAB
25(OH)D3 SERPL-MCNC: 34 NG/ML (ref 30–100)
ALBUMIN SERPL BCP-MCNC: 4.8 G/DL (ref 3.2–4.9)
ALBUMIN/GLOB SERPL: 2.2 G/DL
ALP SERPL-CCNC: 74 U/L (ref 30–99)
ALT SERPL-CCNC: 13 U/L (ref 2–50)
ANION GAP SERPL CALC-SCNC: 13 MMOL/L (ref 7–16)
AST SERPL-CCNC: 17 U/L (ref 12–45)
BILIRUB SERPL-MCNC: 0.5 MG/DL (ref 0.1–1.5)
BUN SERPL-MCNC: 19 MG/DL (ref 8–22)
CALCIUM SERPL-MCNC: 9.2 MG/DL (ref 8.5–10.5)
CHLORIDE SERPL-SCNC: 102 MMOL/L (ref 96–112)
CO2 SERPL-SCNC: 23 MMOL/L (ref 20–33)
CREAT SERPL-MCNC: 0.89 MG/DL (ref 0.5–1.4)
ESTRADIOL SERPL-MCNC: 20.4 PG/ML
FSH SERPL-ACNC: 3 MIU/ML (ref 1.5–12.4)
GFR SERPLBLD CREATININE-BSD FMLA CKD-EPI: 124 ML/MIN/1.73 M 2
GLOBULIN SER CALC-MCNC: 2.2 G/DL (ref 1.9–3.5)
GLUCOSE SERPL-MCNC: 82 MG/DL (ref 65–99)
LH SERPL-ACNC: 4.6 IU/L (ref 1.7–8.6)
POTASSIUM SERPL-SCNC: 4.2 MMOL/L (ref 3.6–5.5)
PROGEST SERPL-MCNC: 0.41 NG/ML
PROT SERPL-MCNC: 7 G/DL (ref 6–8.2)
SODIUM SERPL-SCNC: 138 MMOL/L (ref 135–145)
T4 FREE SERPL-MCNC: 1.46 NG/DL (ref 0.93–1.7)
TSH SERPL DL<=0.005 MIU/L-ACNC: 0.79 UIU/ML (ref 0.38–5.33)

## 2022-05-12 PROCEDURE — 84443 ASSAY THYROID STIM HORMONE: CPT

## 2022-05-12 PROCEDURE — 84270 ASSAY OF SEX HORMONE GLOBUL: CPT

## 2022-05-12 PROCEDURE — 84144 ASSAY OF PROGESTERONE: CPT

## 2022-05-12 PROCEDURE — 84402 ASSAY OF FREE TESTOSTERONE: CPT

## 2022-05-12 PROCEDURE — 83002 ASSAY OF GONADOTROPIN (LH): CPT

## 2022-05-12 PROCEDURE — 83001 ASSAY OF GONADOTROPIN (FSH): CPT

## 2022-05-12 PROCEDURE — 82306 VITAMIN D 25 HYDROXY: CPT

## 2022-05-12 PROCEDURE — 80053 COMPREHEN METABOLIC PANEL: CPT

## 2022-05-12 PROCEDURE — 84403 ASSAY OF TOTAL TESTOSTERONE: CPT

## 2022-05-12 PROCEDURE — 84439 ASSAY OF FREE THYROXINE: CPT

## 2022-05-12 PROCEDURE — 82670 ASSAY OF TOTAL ESTRADIOL: CPT

## 2022-05-12 PROCEDURE — 36415 COLL VENOUS BLD VENIPUNCTURE: CPT

## 2022-05-12 NOTE — PROGRESS NOTES
Chief Complaint: Follow up for  Gender Dysphoria, Male to Female Transgender.     HPI:     Chris Larios is a 21 y.o. adult here for follow up of gender dysphoria and male to female transgender    She was previously seen by another endocrinologist   Comorbid conditions include ADHD and depression  She was previously on gender affirming therapy with estradiol and spironolactone but then she discontinued the medications because she reported that she was unsure if she wanted to pursue this.  She is being followed by a therapist      Currently she is not on estrogen and spironolactone    We do not have updated labs      Today she informed me that she is really sure that she wants to pursue gender affirming therapy.  She is interested in restarting hormone therapy  She denies depression and suicidal ideation  She is a non-smoker  She denies a history of blood clots or DVT      Patient's medications, allergies, and social histories were reviewed and updated as appropriate.      ROS:       CONS:     No fever, no chills   EYES:     No diplopia, no blurry vision   CV:           No chest pain, no palpitations   PULM:     No SOB, no cough, no hemoptysis.   GI:            No nausea, no vomiting, no diarrhea, no constipation   ENDO:     No polyuria, no polydipsia, no heat intolerance, no cold intolerance     Past Medical History:  Problem List:  2021-01: High risk medication use  2020-05: Male-to-female transgender person  2019-12: Attention deficit hyperactivity disorder (ADHD), combined   type  2018-08: Sick sinus syndrome (HCC)  2018-06: Cardiac pacemaker in situ  2018-06: History of epilepsy  2018-06: Current moderate episode of major depressive disorder   without prior episode (HCC)  2018-06: Juvenile idiopathic scoliosis of thoracic region  2017-02: Depression, unspecified      Past Surgical History:  Past Surgical History:   Procedure Laterality Date   • PACEMAKER INSERTION Left 12/12/2008    [employer] Adapta  "ADDR01 implanted in [place], [place].   • TONSILLECTOMY AND ADENOIDECTOMY          Allergies:  Patient has no known allergies.     Social History:  Social History     Tobacco Use   • Smoking status: Never Smoker   • Smokeless tobacco: Never Used   Vaping Use   • Vaping Use: Never used   Substance Use Topics   • Alcohol use: No   • Drug use: Yes     Types: Marijuana, Oral     Comment: rarely        Family History:   family history includes Alcohol abuse in her father and paternal grandmother; Alcohol/Drug in her father; Cancer in her brother; Diabetes in her maternal grandmother; Psychiatric Illness in her mother; Schizophrenia in her mother.      PHYSICAL EXAM:   Vital signs: /60 (BP Location: Left arm, Patient Position: Sitting, BP Cuff Size: Adult)   Pulse 98   Ht 1.727 m (5' 8\")   Wt 60.3 kg (133 lb)   SpO2 97%   BMI 20.22 kg/m²   GENERAL: Well-developed, well-nourished in no apparent distress.   EYE:  No ocular asymmetry, PERRLA  HENT: Pink, moist mucous membranes.    NECK: No thyromegaly.   CARDIOVASCULAR:  No murmurs  LUNGS: Clear breath sounds  ABDOMEN: Soft, nontender   EXTREMITIES: No clubbing, cyanosis, or edema.   NEUROLOGICAL: No gross focal motor abnormalities   LYMPH: No cervical adenopathy seen   SKIN: No rashes, lesions.       Labs:  Lab Results   Component Value Date/Time    WBC 7.0 05/12/2021 09:02 AM    RBC 5.21 05/12/2021 09:02 AM    HEMOGLOBIN 15.2 06/17/2021 09:44 AM    MCV 92.9 05/12/2021 09:02 AM    MCH 29.2 05/12/2021 09:02 AM    MCHC 31.4 (L) 05/12/2021 09:02 AM    RDW 42.6 05/12/2021 09:02 AM    MPV 10.5 05/12/2021 09:02 AM       Lab Results   Component Value Date/Time    SODIUM 139 08/12/2021 06:57 AM    SODIUM 139 06/17/2021 09:44 AM    POTASSIUM 4.0 08/12/2021 06:57 AM    POTASSIUM 4.0 06/17/2021 09:44 AM    CHLORIDE 101 08/12/2021 06:57 AM    CHLORIDE 103 06/17/2021 09:44 AM    CO2 22 08/12/2021 06:57 AM    CO2 25 06/17/2021 09:44 AM    ANION 11.0 06/17/2021 09:44 AM    GLUCOSE " 97 08/12/2021 06:57 AM    GLUCOSE 95 06/17/2021 09:44 AM    BUN 14 08/12/2021 06:57 AM    BUN 20 06/17/2021 09:44 AM    CREATININE 0.99 08/12/2021 06:57 AM    CREATININE 0.97 06/17/2021 09:44 AM    CREATININE 0.6 11/04/2008 08:05 AM    CALCIUM 9.8 08/12/2021 06:57 AM    CALCIUM 9.5 06/17/2021 09:44 AM    ASTSGOT 19 08/12/2021 06:57 AM    ASTSGOT 13 06/17/2021 09:44 AM    ALTSGPT 11 08/12/2021 06:57 AM    ALTSGPT 9 06/17/2021 09:44 AM    TBILIRUBIN 0.5 08/12/2021 06:57 AM    TBILIRUBIN 0.4 06/17/2021 09:44 AM    ALBUMIN 4.9 08/12/2021 06:57 AM    ALBUMIN 4.7 06/17/2021 09:44 AM    TOTPROTEIN 7.7 08/12/2021 06:57 AM    TOTPROTEIN 7.6 06/17/2021 09:44 AM    GLOBULIN 2.8 08/12/2021 06:57 AM    GLOBULIN 2.9 06/17/2021 09:44 AM    AGRATIO 1.8 08/12/2021 06:57 AM    AGRATIO 1.6 06/17/2021 09:44 AM       Lab Results   Component Value Date/Time    TSHULTRASEN 1.630 01/10/2020 0826     No results found for: FREEDIR  No results found for: FREET3  No results found for: THYSTIMIG      Imaging:    ASSESSMENT/PLAN:     1. Male-to-female transgender person   stable   I want her to get baseline labs  And after completion of the labs she can restart estradiol 2 mg daily   and spironolactone 50 mg twice a day  I then recommended she complete labs again in 6 months  I want her to follow-up in 6 months    2. Vitamin D deficiency  I am checking calcium and vitamin D levels today and in 6 months    3. High risk medication use  Patient is going to restart estrogen and spironolactone which are high risk medications      Return in about 6 months (around 11/11/2022).      Thank you kindly for allowing me to participate in the endocrine care plan for this patient.    Sekou Duenas MD, [facility], [facility]  01/05/21    CC:   Cherie Brian M.D.

## 2022-05-13 LAB
SHBG SERPL-SCNC: 48 NMOL/L (ref 17–56)
TESTOST FREE MFR SERPL: 1.7 % (ref 1.6–2.9)
TESTOST FREE SERPL-MCNC: 153 PG/ML (ref 47–244)
TESTOST SERPL-MCNC: 906 NG/DL (ref 300–1080)

## 2022-07-27 ENCOUNTER — OFFICE VISIT (OUTPATIENT)
Dept: MEDICAL GROUP | Facility: LAB | Age: 22
End: 2022-07-27
Payer: COMMERCIAL

## 2022-07-27 VITALS
SYSTOLIC BLOOD PRESSURE: 104 MMHG | OXYGEN SATURATION: 95 % | HEIGHT: 68 IN | RESPIRATION RATE: 14 BRPM | HEART RATE: 104 BPM | BODY MASS INDEX: 19.73 KG/M2 | TEMPERATURE: 98.1 F | DIASTOLIC BLOOD PRESSURE: 62 MMHG | WEIGHT: 130.2 LBS

## 2022-07-27 DIAGNOSIS — F90.2 ATTENTION DEFICIT HYPERACTIVITY DISORDER (ADHD), COMBINED TYPE: ICD-10-CM

## 2022-07-27 DIAGNOSIS — F32.1 CURRENT MODERATE EPISODE OF MAJOR DEPRESSIVE DISORDER WITHOUT PRIOR EPISODE (HCC): ICD-10-CM

## 2022-07-27 DIAGNOSIS — Z78.9 MALE-TO-FEMALE TRANSGENDER PERSON: ICD-10-CM

## 2022-07-27 PROCEDURE — 99213 OFFICE O/P EST LOW 20 MIN: CPT | Performed by: NURSE PRACTITIONER

## 2022-07-27 ASSESSMENT — FIBROSIS 4 INDEX: FIB4 SCORE: 0.38

## 2022-07-27 ASSESSMENT — PATIENT HEALTH QUESTIONNAIRE - PHQ9: CLINICAL INTERPRETATION OF PHQ2 SCORE: 0

## 2022-07-27 NOTE — PROGRESS NOTES
Subjective:     CC:   Chief Complaint   Patient presents with   • Follow-Up     Endocrinologist follow up     HPI:   Chris presents today with the following:    Current moderate episode of major depressive disorder without prior episode (HCC)  This is a chronic condition.  Still seeing Dr. Guillen for medication management.  Currently taking fluoxetine 40 mg daily, denies side effects.  Denies SI/H.    Depression Screen (PHQ-2/PHQ-9) 11/9/2021 5/11/2022 7/27/2022   PHQ-2 Total Score - - -   PHQ-2 Total Score - - -   PHQ-2 Total Score 4 0 0   PHQ-9 Total Score - - -   PHQ-9 Total Score - - -   PHQ-9 Total Score 15 - -       Interpretation of PHQ-9 Total Score   Score Severity   1-4 No Depression   5-9 Mild Depression   10-14 Moderate Depression   15-19 Moderately Severe Depression   20-27 Severe Depression      Male-to-female transgender person  Currently followed by Dr. Thomas. Patient reports that she stopped taking estradiol in January, but restarted in in May. She is feeling a lot better since restarting the hormones. She plans to continue HRT.     Attention deficit hyperactivity disorder (ADHD), combined type  This is a chronic condition.  Patient was diagnosed in 2019 by a psychiatrist.  Currently followed by Dr. Guillen.  Currently taking Adderall as directed. Denies any weight loss, issues sleeping, or palpitations.    ROS:   Gen: no fevers/chills, no changes in weight  Eyes: no changes in vision  ENT: no sore throat, no hearing loss, no bloody nose  Pulm: no sob, no cough  CV: no chest pain, no palpitations  GI: no nausea/vomiting, no diarrhea  : no dysuria  MSk: no myalgias  Skin: no rash  Neuro: no headaches, no numbness/tingling  Heme/Lymph: no easy bruising        - NOTE: All other systems reviewed and are negative, except as in HPI.    Objective:     Exam: /62 (BP Location: Right arm, Patient Position: Sitting, BP Cuff Size: Adult)   Pulse (!) 104   Temp 36.7 °C (98.1 °F)   Resp 14   Ht  "1.727 m (5' 8\")   Wt 59.1 kg (130 lb 3.2 oz)   SpO2 95%  Body mass index is 19.8 kg/m².    Constitutional: Alert, no distress, well-groomed.  Skin: Warm, dry, good turgor, no rashes in visible areas.  Eye: Equal, round and reactive, conjunctiva clear, lids normal.  ENMT: Lips without lesions, good dentition, moist mucous membranes.  Neck: Trachea midline, no masses, no thyromegaly.  Respiratory: Unlabored respiratory effort, no cough. Clear to ausculation. No rales, ronchi, or wheezing.  Cardiovascular: Regular rate and rhythm without murmur. Carotid and radial pulses are intact and equal bilaterally.  MSK: Normal gait, moves all extremities.  Neuro: Grossly non-focal.   Psych: Alert and oriented x3, normal affect and mood.    Assessment & Plan:     21 y.o. adult with the following -     1. Current moderate episode of major depressive disorder without prior episode (HCC)  2. Attention deficit hyperactivity disorder (ADHD), combined type  Patient is feeling well on current medications.  Will continue.  Patient to continue to follow-up with Dr. Guillen.  Denies any suicidal or homicidal ideation. Discussed that should the patient have any symptoms they should call suicide prevention hotline or report to the emergency room immediately. Emphasized importance of healthy diet and exercise.    - CBC WITHOUT DIFFERENTIAL; Future    3. Male-to-female transgender person  Patient to continue medications as prescribed.  Continue to follow-up with Dr. Thomas.    "

## 2022-07-27 NOTE — ASSESSMENT & PLAN NOTE
Currently followed by Dr. Thomas. Patient reports that she stopped taking estradiol in January, but restarted in in May. She is feeling a lot better since restarting the hormones. She plans to continue HRT.

## 2022-07-27 NOTE — ASSESSMENT & PLAN NOTE
This is a chronic condition.  Still seeing Dr. Guillen for medication management.  Currently taking fluoxetine 40 mg daily, denies side effects.  Denies SI/H.    Depression Screen (PHQ-2/PHQ-9) 11/9/2021 5/11/2022 7/27/2022   PHQ-2 Total Score - - -   PHQ-2 Total Score - - -   PHQ-2 Total Score 4 0 0   PHQ-9 Total Score - - -   PHQ-9 Total Score - - -   PHQ-9 Total Score 15 - -       Interpretation of PHQ-9 Total Score   Score Severity   1-4 No Depression   5-9 Mild Depression   10-14 Moderate Depression   15-19 Moderately Severe Depression   20-27 Severe Depression

## 2022-07-27 NOTE — ASSESSMENT & PLAN NOTE
This is a chronic condition.  Patient was diagnosed in 2019 by a psychiatrist.  Currently followed by Dr. Guillen.  Currently taking Adderall as directed. Denies any weight loss, issues sleeping, or palpitations.

## 2022-09-14 ENCOUNTER — HOSPITAL ENCOUNTER (OUTPATIENT)
Dept: LAB | Facility: MEDICAL CENTER | Age: 22
End: 2022-09-14
Attending: INTERNAL MEDICINE
Payer: COMMERCIAL

## 2022-09-14 ENCOUNTER — HOSPITAL ENCOUNTER (OUTPATIENT)
Dept: LAB | Facility: MEDICAL CENTER | Age: 22
End: 2022-09-14
Attending: NURSE PRACTITIONER
Payer: COMMERCIAL

## 2022-09-14 DIAGNOSIS — Z79.899 HIGH RISK MEDICATION USE: ICD-10-CM

## 2022-09-14 DIAGNOSIS — Z78.9 MALE-TO-FEMALE TRANSGENDER PERSON: ICD-10-CM

## 2022-09-14 DIAGNOSIS — E55.9 VITAMIN D DEFICIENCY: ICD-10-CM

## 2022-09-14 DIAGNOSIS — F32.1 CURRENT MODERATE EPISODE OF MAJOR DEPRESSIVE DISORDER WITHOUT PRIOR EPISODE (HCC): ICD-10-CM

## 2022-09-14 LAB
25(OH)D3 SERPL-MCNC: 29 NG/ML (ref 30–100)
ALBUMIN SERPL BCP-MCNC: 4.6 G/DL (ref 3.2–4.9)
ALBUMIN/GLOB SERPL: 1.8 G/DL
ALP SERPL-CCNC: 70 U/L (ref 30–99)
ALT SERPL-CCNC: 14 U/L (ref 2–50)
ANION GAP SERPL CALC-SCNC: 11 MMOL/L (ref 7–16)
AST SERPL-CCNC: 17 U/L (ref 12–45)
BILIRUB SERPL-MCNC: 0.3 MG/DL (ref 0.1–1.5)
BUN SERPL-MCNC: 15 MG/DL (ref 8–22)
CALCIUM SERPL-MCNC: 9.4 MG/DL (ref 8.5–10.5)
CHLORIDE SERPL-SCNC: 101 MMOL/L (ref 96–112)
CO2 SERPL-SCNC: 26 MMOL/L (ref 20–33)
CREAT SERPL-MCNC: 0.77 MG/DL (ref 0.5–1.4)
ERYTHROCYTE [DISTWIDTH] IN BLOOD BY AUTOMATED COUNT: 43.2 FL (ref 35.9–50)
ESTRADIOL SERPL-MCNC: 80.7 PG/ML
GFR SERPLBLD CREATININE-BSD FMLA CKD-EPI: 130 ML/MIN/1.73 M 2
GLOBULIN SER CALC-MCNC: 2.6 G/DL (ref 1.9–3.5)
GLUCOSE SERPL-MCNC: 85 MG/DL (ref 65–99)
HCT VFR BLD AUTO: 48.1 % (ref 42–52)
HGB BLD-MCNC: 16 G/DL (ref 14–18)
MCH RBC QN AUTO: 30.3 PG (ref 27–33)
MCHC RBC AUTO-ENTMCNC: 33.3 G/DL (ref 33.7–35.3)
MCV RBC AUTO: 91.1 FL (ref 81.4–97.8)
PLATELET # BLD AUTO: 325 K/UL (ref 164–446)
PMV BLD AUTO: 9.4 FL (ref 9–12.9)
POTASSIUM SERPL-SCNC: 4.4 MMOL/L (ref 3.6–5.5)
PROT SERPL-MCNC: 7.2 G/DL (ref 6–8.2)
RBC # BLD AUTO: 5.28 M/UL (ref 4.7–6.1)
SODIUM SERPL-SCNC: 138 MMOL/L (ref 135–145)
WBC # BLD AUTO: 5.1 K/UL (ref 4.8–10.8)

## 2022-09-14 PROCEDURE — 84402 ASSAY OF FREE TESTOSTERONE: CPT

## 2022-09-14 PROCEDURE — 82306 VITAMIN D 25 HYDROXY: CPT

## 2022-09-14 PROCEDURE — 82670 ASSAY OF TOTAL ESTRADIOL: CPT

## 2022-09-14 PROCEDURE — 85027 COMPLETE CBC AUTOMATED: CPT

## 2022-09-14 PROCEDURE — 36415 COLL VENOUS BLD VENIPUNCTURE: CPT

## 2022-09-14 PROCEDURE — 84270 ASSAY OF SEX HORMONE GLOBUL: CPT

## 2022-09-14 PROCEDURE — 84403 ASSAY OF TOTAL TESTOSTERONE: CPT

## 2022-09-14 PROCEDURE — 80053 COMPREHEN METABOLIC PANEL: CPT

## 2022-09-15 DIAGNOSIS — Z79.899 HIGH RISK MEDICATION USE: ICD-10-CM

## 2022-09-15 DIAGNOSIS — Z78.9 MALE-TO-FEMALE TRANSGENDER PERSON: ICD-10-CM

## 2022-09-16 RX ORDER — ESTRADIOL 2 MG/1
2 TABLET ORAL DAILY
Qty: 90 TABLET | Refills: 2 | Status: SHIPPED | OUTPATIENT
Start: 2022-09-16 | End: 2022-09-20 | Stop reason: SDUPTHER

## 2022-09-16 RX ORDER — SPIRONOLACTONE 50 MG/1
50 TABLET, FILM COATED ORAL 2 TIMES DAILY
Qty: 180 TABLET | Refills: 3 | Status: SHIPPED | OUTPATIENT
Start: 2022-09-16 | End: 2023-08-29

## 2022-09-17 LAB
SHBG SERPL-SCNC: 73 NMOL/L (ref 17–56)
TESTOST FREE MFR SERPL: 1.2 % (ref 1.6–2.9)
TESTOST FREE SERPL-MCNC: 104 PG/ML (ref 47–244)
TESTOST SERPL-MCNC: 847 NG/DL (ref 300–1080)

## 2022-09-20 DIAGNOSIS — Z78.9 MALE-TO-FEMALE TRANSGENDER PERSON: ICD-10-CM

## 2022-09-20 DIAGNOSIS — Z79.899 HIGH RISK MEDICATION USE: ICD-10-CM

## 2022-09-22 RX ORDER — ESTRADIOL 2 MG/1
2 TABLET ORAL DAILY
Qty: 90 TABLET | Refills: 2 | Status: SHIPPED | OUTPATIENT
Start: 2022-09-22 | End: 2022-11-25 | Stop reason: SDUPTHER

## 2022-10-06 ENCOUNTER — TELEPHONE (OUTPATIENT)
Dept: MEDICAL GROUP | Facility: LAB | Age: 22
End: 2022-10-06
Payer: COMMERCIAL

## 2022-10-06 DIAGNOSIS — Z78.9 MALE-TO-FEMALE TRANSGENDER PERSON: ICD-10-CM

## 2022-10-06 DIAGNOSIS — Z79.899 HIGH RISK MEDICATION USE: ICD-10-CM

## 2022-11-21 DIAGNOSIS — Z78.9 MALE-TO-FEMALE TRANSGENDER PERSON: ICD-10-CM

## 2022-11-21 RX ORDER — PROGESTERONE 100 MG/1
100 CAPSULE ORAL DAILY
Qty: 90 CAPSULE | Refills: 3 | Status: SHIPPED | OUTPATIENT
Start: 2022-11-21 | End: 2023-10-07 | Stop reason: SDUPTHER

## 2022-11-25 DIAGNOSIS — Z79.899 HIGH RISK MEDICATION USE: ICD-10-CM

## 2022-11-25 DIAGNOSIS — Z78.9 MALE-TO-FEMALE TRANSGENDER PERSON: ICD-10-CM

## 2022-11-28 RX ORDER — ESTRADIOL 2 MG/1
2 TABLET ORAL DAILY
Qty: 90 TABLET | Refills: 2 | Status: SHIPPED
Start: 2022-11-28 | End: 2023-02-08

## 2023-01-26 DIAGNOSIS — Z79.899 HIGH RISK MEDICATION USE: ICD-10-CM

## 2023-01-26 DIAGNOSIS — E55.9 VITAMIN D DEFICIENCY: ICD-10-CM

## 2023-01-26 DIAGNOSIS — Z78.9 MALE-TO-FEMALE TRANSGENDER PERSON: ICD-10-CM

## 2023-02-06 ENCOUNTER — OFFICE VISIT (OUTPATIENT)
Dept: ENDOCRINOLOGY | Facility: MEDICAL CENTER | Age: 23
End: 2023-02-06
Attending: INTERNAL MEDICINE
Payer: COMMERCIAL

## 2023-02-06 ENCOUNTER — HOSPITAL ENCOUNTER (OUTPATIENT)
Dept: LAB | Facility: MEDICAL CENTER | Age: 23
End: 2023-02-06
Attending: INTERNAL MEDICINE
Payer: COMMERCIAL

## 2023-02-06 VITALS
HEIGHT: 68 IN | DIASTOLIC BLOOD PRESSURE: 82 MMHG | WEIGHT: 139.3 LBS | SYSTOLIC BLOOD PRESSURE: 112 MMHG | OXYGEN SATURATION: 98 % | BODY MASS INDEX: 21.11 KG/M2 | HEART RATE: 98 BPM

## 2023-02-06 DIAGNOSIS — Z78.9 MALE-TO-FEMALE TRANSGENDER PERSON: ICD-10-CM

## 2023-02-06 DIAGNOSIS — E55.9 VITAMIN D DEFICIENCY: ICD-10-CM

## 2023-02-06 DIAGNOSIS — Z79.899 HIGH RISK MEDICATION USE: ICD-10-CM

## 2023-02-06 LAB
25(OH)D3 SERPL-MCNC: 26 NG/ML (ref 30–100)
ALBUMIN SERPL BCP-MCNC: 4.8 G/DL (ref 3.2–4.9)
ALBUMIN/GLOB SERPL: 1.8 G/DL
ALP SERPL-CCNC: 57 U/L (ref 30–99)
ALT SERPL-CCNC: 8 U/L (ref 2–50)
ANION GAP SERPL CALC-SCNC: 13 MMOL/L (ref 7–16)
AST SERPL-CCNC: 14 U/L (ref 12–45)
BILIRUB SERPL-MCNC: 0.3 MG/DL (ref 0.1–1.5)
BUN SERPL-MCNC: 12 MG/DL (ref 8–22)
CALCIUM ALBUM COR SERPL-MCNC: 8.7 MG/DL (ref 8.5–10.5)
CALCIUM SERPL-MCNC: 9.3 MG/DL (ref 8.5–10.5)
CHLORIDE SERPL-SCNC: 103 MMOL/L (ref 96–112)
CO2 SERPL-SCNC: 23 MMOL/L (ref 20–33)
CREAT SERPL-MCNC: 0.8 MG/DL (ref 0.5–1.4)
ESTRADIOL SERPL-MCNC: 67.6 PG/ML
GFR SERPLBLD CREATININE-BSD FMLA CKD-EPI: 128 ML/MIN/1.73 M 2
GLOBULIN SER CALC-MCNC: 2.6 G/DL (ref 1.9–3.5)
GLUCOSE SERPL-MCNC: 93 MG/DL (ref 65–99)
POTASSIUM SERPL-SCNC: 4 MMOL/L (ref 3.6–5.5)
PROT SERPL-MCNC: 7.4 G/DL (ref 6–8.2)
SODIUM SERPL-SCNC: 139 MMOL/L (ref 135–145)
T4 FREE SERPL-MCNC: 1.23 NG/DL (ref 0.93–1.7)
TSH SERPL DL<=0.005 MIU/L-ACNC: 1.01 UIU/ML (ref 0.38–5.33)

## 2023-02-06 PROCEDURE — 82670 ASSAY OF TOTAL ESTRADIOL: CPT

## 2023-02-06 PROCEDURE — 84443 ASSAY THYROID STIM HORMONE: CPT

## 2023-02-06 PROCEDURE — 36415 COLL VENOUS BLD VENIPUNCTURE: CPT

## 2023-02-06 PROCEDURE — 82306 VITAMIN D 25 HYDROXY: CPT

## 2023-02-06 PROCEDURE — 99214 OFFICE O/P EST MOD 30 MIN: CPT | Performed by: INTERNAL MEDICINE

## 2023-02-06 PROCEDURE — 80053 COMPREHEN METABOLIC PANEL: CPT

## 2023-02-06 PROCEDURE — 84403 ASSAY OF TOTAL TESTOSTERONE: CPT

## 2023-02-06 PROCEDURE — 84402 ASSAY OF FREE TESTOSTERONE: CPT

## 2023-02-06 PROCEDURE — 99211 OFF/OP EST MAY X REQ PHY/QHP: CPT | Performed by: INTERNAL MEDICINE

## 2023-02-06 PROCEDURE — 84270 ASSAY OF SEX HORMONE GLOBUL: CPT

## 2023-02-06 PROCEDURE — 84439 ASSAY OF FREE THYROXINE: CPT

## 2023-02-06 ASSESSMENT — FIBROSIS 4 INDEX: FIB4 SCORE: 0.31

## 2023-02-06 NOTE — PROGRESS NOTES
Chief Complaint: Follow up for  Gender Dysphoria, Male to Female Transgender.     HPI:     Chris Larios is a 22 y.o. adult here for follow up of gender dysphoria and male to female transgender    She was previously seen by another endocrinologist   Comorbid conditions include ADHD and depression    Currently she is on gender affirming therapy with estradiol tablets 2 mg daily, spironolactone 100 mg daily and progesterone 100 mg daily    She reports that she wants to have more feminizing features (increase in breast size and wider hips, smoother skin)    She denies depression and suicidal ideation  She is a non-smoker  She denies a history of blood clots or DVT    Unfortunately she was unable to get labs done prior to this visit    Her labs last time showed an estradiol of 80, testosterone was 847 SHBG was 73 vitamin D was low at 29 on September 14, 2022  CMP was normal          Patient's medications, allergies, and social histories were reviewed and updated as appropriate.      ROS:       CONS:     No fever, no chills   EYES:     No diplopia, no blurry vision   CV:           No chest pain, no palpitations   PULM:     No SOB, no cough, no hemoptysis.   GI:            No nausea, no vomiting, no diarrhea, no constipation   ENDO:     No polyuria, no polydipsia, no heat intolerance, no cold intolerance     Past Medical History:  Problem List:  2021-01: High risk medication use  2020-05: Male-to-female transgender person  2019-12: Attention deficit hyperactivity disorder (ADHD), combined   type  2018-08: Sick sinus syndrome (HCC)  2018-06: Cardiac pacemaker in situ  2018-06: History of epilepsy  2018-06: Current moderate episode of major depressive disorder   without prior episode (HCC)  2018-06: Juvenile idiopathic scoliosis of thoracic region      Past Surgical History:  Past Surgical History:   Procedure Laterality Date    PACEMAKER INSERTION Left 12/12/2008    [employer] Adapta [id number] implanted in [place],  "[place].    TONSILLECTOMY AND ADENOIDECTOMY          Allergies:  Patient has no known allergies.     Social History:  Social History     Tobacco Use    Smoking status: Never    Smokeless tobacco: Never   Vaping Use    Vaping Use: Never used   Substance Use Topics    Alcohol use: No    Drug use: Yes     Types: Marijuana, Oral     Comment: rarely        Family History:   family history includes Alcohol abuse in her father and paternal grandmother; Alcohol/Drug in her father; Cancer in her brother; Diabetes in her maternal grandmother; Psychiatric Illness in her mother; Schizophrenia in her mother.      PHYSICAL EXAM:   Vital signs: /82 (BP Location: Right arm, Patient Position: Sitting, BP Cuff Size: Adult)   Pulse 98   Ht 1.727 m (5' 8\")   Wt 63.2 kg (139 lb 4.8 oz)   SpO2 98%   BMI 21.18 kg/m²   GENERAL: Well-developed, well-nourished in no apparent distress.   EYE:  No ocular asymmetry, PERRLA  HENT: Pink, moist mucous membranes.    NECK: No thyromegaly.   CARDIOVASCULAR:  No murmurs  LUNGS: Clear breath sounds  ABDOMEN: Soft, nontender   EXTREMITIES: No clubbing, cyanosis, or edema.   NEUROLOGICAL: No gross focal motor abnormalities   LYMPH: No cervical adenopathy seen   SKIN: No rashes, lesions.       Labs:  Lab Results   Component Value Date/Time    WBC 5.1 09/14/2022 08:42 AM    RBC 5.28 09/14/2022 08:42 AM    HEMOGLOBIN 16.0 09/14/2022 08:42 AM    MCV 91.1 09/14/2022 08:42 AM    MCH 30.3 09/14/2022 08:42 AM    MCHC 33.3 (L) 09/14/2022 08:42 AM    RDW 43.2 09/14/2022 08:42 AM    MPV 9.4 09/14/2022 08:42 AM       Lab Results   Component Value Date/Time    SODIUM 138 09/14/2022 08:42 AM    POTASSIUM 4.4 09/14/2022 08:42 AM    CHLORIDE 101 09/14/2022 08:42 AM    CO2 26 09/14/2022 08:42 AM    ANION 11.0 09/14/2022 08:42 AM    GLUCOSE 85 09/14/2022 08:42 AM    BUN 15 09/14/2022 08:42 AM    CREATININE 0.77 09/14/2022 08:42 AM    CREATININE 0.6 11/04/2008 08:05 AM    CALCIUM 9.4 09/14/2022 08:42 AM    TAYLOR 17 " 09/14/2022 08:42 AM    ALTSGPT 14 09/14/2022 08:42 AM    TBILIRUBIN 0.3 09/14/2022 08:42 AM    ALBUMIN 4.6 09/14/2022 08:42 AM    TOTPROTEIN 7.2 09/14/2022 08:42 AM    GLOBULIN 2.6 09/14/2022 08:42 AM    AGRATIO 1.8 09/14/2022 08:42 AM       Lab Results   Component Value Date/Time    TSHFELICIA 1.630 01/10/2020 0826     No results found for: FREEDIR  No results found for: FREET3  No results found for: THYSTIMIG      Imaging:    ASSESSMENT/PLAN:     1. Male-to-female transgender person   Stable  I want her to get labs again today so I can determine if we need to increase her estradiol dose  Reminded patient to get labs again prior to next visit  For now continue estradiol 2 mg daily  Continue spironolactone 100 mg daily  Continue progesterone 100 mg daily  Follow-up in 6 months with labs      2. Vitamin D deficiency  Uncontrolled  I recommend that she take vitamin D3 5000 IU daily  We are getting updated labs today and in 6 months    3. High risk medication use  Patient is going to restart estrogen and spironolactone which are high risk medications      Return in about 6 months (around 8/6/2023).      Thank you kindly for allowing me to participate in the endocrine care plan for this patient.    Sekou Duenas MD, [facility], [facility]  01/05/21    CC:   Cherie Brian M.D.

## 2023-02-07 LAB
SHBG SERPL-SCNC: 105 NMOL/L (ref 17–56)
TESTOST FREE MFR SERPL: 0.8 % (ref 1.6–2.9)
TESTOST FREE SERPL-MCNC: 28 PG/ML (ref 47–244)
TESTOST SERPL-MCNC: 341 NG/DL (ref 300–1080)

## 2023-02-08 DIAGNOSIS — Z78.9 MALE-TO-FEMALE TRANSGENDER PERSON: ICD-10-CM

## 2023-02-08 RX ORDER — ESTRADIOL 1 MG/1
4 TABLET ORAL DAILY
Qty: 120 TABLET | Refills: 6 | Status: SHIPPED | OUTPATIENT
Start: 2023-02-08 | End: 2023-09-27 | Stop reason: SDUPTHER
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
## 2023-08-08 ENCOUNTER — TELEPHONE (OUTPATIENT)
Dept: ENDOCRINOLOGY | Facility: MEDICAL CENTER | Age: 23
End: 2023-08-08
Payer: COMMERCIAL

## 2023-08-15 ENCOUNTER — APPOINTMENT (OUTPATIENT)
Dept: ENDOCRINOLOGY | Facility: MEDICAL CENTER | Age: 23
End: 2023-08-15
Attending: INTERNAL MEDICINE
Payer: COMMERCIAL

## 2023-08-29 DIAGNOSIS — Z79.899 HIGH RISK MEDICATION USE: ICD-10-CM

## 2023-08-29 RX ORDER — SPIRONOLACTONE 50 MG/1
50 TABLET, FILM COATED ORAL 2 TIMES DAILY
Qty: 180 TABLET | Refills: 0 | Status: SHIPPED | OUTPATIENT
Start: 2023-08-29 | End: 2023-11-24 | Stop reason: SDUPTHER

## 2023-08-31 ENCOUNTER — OFFICE VISIT (OUTPATIENT)
Dept: ENDOCRINOLOGY | Facility: MEDICAL CENTER | Age: 23
End: 2023-08-31
Attending: INTERNAL MEDICINE
Payer: COMMERCIAL

## 2023-08-31 ENCOUNTER — APPOINTMENT (OUTPATIENT)
Dept: ENDOCRINOLOGY | Facility: MEDICAL CENTER | Age: 23
End: 2023-08-31
Attending: STUDENT IN AN ORGANIZED HEALTH CARE EDUCATION/TRAINING PROGRAM
Payer: COMMERCIAL

## 2023-08-31 VITALS
WEIGHT: 137 LBS | SYSTOLIC BLOOD PRESSURE: 106 MMHG | HEIGHT: 68 IN | OXYGEN SATURATION: 96 % | BODY MASS INDEX: 20.76 KG/M2 | HEART RATE: 86 BPM | DIASTOLIC BLOOD PRESSURE: 80 MMHG

## 2023-08-31 DIAGNOSIS — Z79.899 HIGH RISK MEDICATION USE: ICD-10-CM

## 2023-08-31 DIAGNOSIS — Z78.9 MALE-TO-FEMALE TRANSGENDER PERSON: ICD-10-CM

## 2023-08-31 DIAGNOSIS — E55.9 VITAMIN D DEFICIENCY: ICD-10-CM

## 2023-08-31 PROCEDURE — 3079F DIAST BP 80-89 MM HG: CPT | Performed by: INTERNAL MEDICINE

## 2023-08-31 PROCEDURE — 3074F SYST BP LT 130 MM HG: CPT | Performed by: INTERNAL MEDICINE

## 2023-08-31 PROCEDURE — 99211 OFF/OP EST MAY X REQ PHY/QHP: CPT | Performed by: INTERNAL MEDICINE

## 2023-08-31 PROCEDURE — 99214 OFFICE O/P EST MOD 30 MIN: CPT | Performed by: INTERNAL MEDICINE

## 2023-08-31 ASSESSMENT — FIBROSIS 4 INDEX: FIB4 SCORE: 0.35

## 2023-08-31 NOTE — PROGRESS NOTES
Chief Complaint: Follow up for  Gender Dysphoria, Male to Female Transgender.     HPI:     Chris Larios is a 23 y.o. adult here for follow up of gender dysphoria and male to female transgender    She was previously seen by another endocrinologist   Comorbid conditions include ADHD and depression    Currently she is on gender affirming therapy with estradiol tablets 4 mg daily, spironolactone 100 mg daily and progesterone 100 mg daily      Since I last saw her I increased her estrogen to 4 mg daily from 2 mg.  However she forgot to do labs prior to this visit.  She does report improvement in her facial features and development of breast tissue after increasing estrogen.    She denies depression and suicidal ideation  She is a non-smoker  She denies a history of blood clots or DVT      Her labs last time ( while on 2mg of estrogen) showed an estradiol of 67, testosterone was 340 SHBG was 105  vitamin D was low at 28 on Feb 2023  CMP was normal          Patient's medications, allergies, and social histories were reviewed and updated as appropriate.      ROS:       CONS:     No fever, no chills   EYES:     No diplopia, no blurry vision   CV:           No chest pain, no palpitations   PULM:     No SOB, no cough, no hemoptysis.   GI:            No nausea, no vomiting, no diarrhea, no constipation   ENDO:     No polyuria, no polydipsia, no heat intolerance, no cold intolerance     Past Medical History:  Problem List:  2021-01: High risk medication use  2020-05: Male-to-female transgender person  2019-12: Attention deficit hyperactivity disorder (ADHD), combined   type  2018-08: Sick sinus syndrome (HCC)  2018-06: Cardiac pacemaker in situ  2018-06: History of epilepsy  2018-06: Current moderate episode of major depressive disorder   without prior episode (HCC)  2018-06: Juvenile idiopathic scoliosis of thoracic region      Past Surgical History:  Past Surgical History:   Procedure Laterality Date    PACEMAKER  "INSERTION Left 12/12/2008    [employer]pta ADDR01 implanted in [place], [place].    TONSILLECTOMY AND ADENOIDECTOMY          Allergies:  Patient has no known allergies.     Social History:  Social History     Tobacco Use    Smoking status: Never    Smokeless tobacco: Never   Vaping Use    Vaping Use: Never used   Substance Use Topics    Alcohol use: No    Drug use: Yes     Types: Marijuana, Oral     Comment: rarely        Family History:   family history includes Alcohol abuse in her father and paternal grandmother; Alcohol/Drug in her father; Cancer in her brother; Diabetes in her maternal grandmother; Psychiatric Illness in her mother; Schizophrenia in her mother.      PHYSICAL EXAM:   Vital signs: /80 (BP Location: Left arm, Patient Position: Sitting, BP Cuff Size: Adult)   Pulse 86   Ht 1.727 m (5' 8\")   Wt 62.1 kg (137 lb)   SpO2 96%   BMI 20.83 kg/m²   GENERAL: Well-developed, well-nourished in no apparent distress.   EYE:  No ocular asymmetry, PERRLA  HENT: Pink, moist mucous membranes.    NECK: No thyromegaly.   CARDIOVASCULAR:  No murmurs  LUNGS: Clear breath sounds  ABDOMEN: Soft, nontender   EXTREMITIES: No clubbing, cyanosis, or edema.   NEUROLOGICAL: No gross focal motor abnormalities   LYMPH: No cervical adenopathy seen   SKIN: No rashes, lesions.       Labs:  Lab Results   Component Value Date/Time    WBC 5.1 09/14/2022 08:42 AM    RBC 5.28 09/14/2022 08:42 AM    HEMOGLOBIN 16.0 09/14/2022 08:42 AM    MCV 91.1 09/14/2022 08:42 AM    MCH 30.3 09/14/2022 08:42 AM    MCHC 33.3 (L) 09/14/2022 08:42 AM    RDW 43.2 09/14/2022 08:42 AM    MPV 9.4 09/14/2022 08:42 AM       Lab Results   Component Value Date/Time    SODIUM 139 02/06/2023 10:28 AM    POTASSIUM 4.0 02/06/2023 10:28 AM    CHLORIDE 103 02/06/2023 10:28 AM    CO2 23 02/06/2023 10:28 AM    ANION 13.0 02/06/2023 10:28 AM    GLUCOSE 93 02/06/2023 10:28 AM    BUN 12 02/06/2023 10:28 AM    CREATININE 0.80 02/06/2023 10:28 AM    CREATININE 0.6 " 11/04/2008 08:05 AM    CALCIUM 9.3 02/06/2023 10:28 AM    ASTSGOT 14 02/06/2023 10:28 AM    ALTSGPT 8 02/06/2023 10:28 AM    TBILIRUBIN 0.3 02/06/2023 10:28 AM    ALBUMIN 4.8 02/06/2023 10:28 AM    TOTPROTEIN 7.4 02/06/2023 10:28 AM    GLOBULIN 2.6 02/06/2023 10:28 AM    AGRATIO 1.8 02/06/2023 10:28 AM       Lab Results   Component Value Date/Time    TSHULTRASEN 1.630 01/10/2020 0826     No results found for: FREEDIR  No results found for: FREET3  No results found for: THYSTIMIG      Imaging:    ASSESSMENT/PLAN:     1. Male-to-female transgender person   Stable  I want her to get labs again today so I can determine if we need to increase her estradiol dose again  Reminded patient to get labs again prior to next visit  For now continue estradiol 4 mg daily  Continue spironolactone 100 mg daily  Continue progesterone 100 mg daily  Follow-up in 6 months with labs this week and prior       2. Vitamin D deficiency  Uncontrolled  I recommend that she take vitamin D3 5000 IU daily  We are getting updated labs today and in 6 months    3. High risk medication use  Patient is going to restart estrogen and spironolactone which are high risk medications      Return in about 6 months (around 2/29/2024).      Thank you kindly for allowing me to participate in the endocrine care plan for this patient.    Sekou Duenas MD, FACE, N      CC:   TAMMIE Camejo

## 2023-08-31 NOTE — ASSESSMENT & PLAN NOTE
[facility]  Office: [id number], DO, Victor Manuel Lockett, DO, Sohail Obando, DO, Pedro Key Blood, DO, Yuliet Friedman MD, Clint Suarez MD, Tomer Osorio MD, Kristie Kang MD,  Mirna Mike MD, John Steward MD, Delcia Epley, DO, Issa Hudson MD,  Geraldo Arguello MD, Jessica Ramírez MD, Priti Kingsley, DO, Sivna Wang MD, Hannah Decker MD, Jennifer Voss DO, Derek Morelos MD, Annie Allen MD, Glo Alejandra MD, Shan Ricketts MD,  Patricia Oglesby DO, Hilary Lawson MD,  Alexys Hendricks, CNP,  Marlene Nelson, CNP, Leo Black, CNP, Merlene Arana, CNP,  Nicole Baumann, DNP, Micha Alcala, CNP, Elieser Schmidt, CNP, Kacy Perez, CNP, Jennifer Castro, CNP, Ena Christian, CNP, Valerie Charles PA-C, Britta Fernández, CNS, Mo Encarnacion, CNP, Janee Maynard, CNP         [address] [facility]    HISTORY AND PHYSICAL EXAMINATION            Date:   8/30/2023  Patient name:  Kimberly Samuel  Date of admission:  8/30/2023  3:19 PM  MRN:   [id number]  Account:  [de-identified]  YOB: 1945  PCP:    Lizet Gomez MD  Room:   DANELLE/DANELLE  Code Status:    Prior    Chief Complaint:     Chief Complaint   Patient presents with    Chest Pain     Pt had outpatient echo 20 minutes pta    Shortness of Breath    Dizziness       History Obtained From:     patient    History of Present Illness:     iKmberly Samuel is a 66 y.o. Non- / non  female who presents with Chest Pain (Pt had outpatient echo 20 minutes pta), Shortness of Breath, and Dizziness   and is admitted to the hospital for the management of Atrial fibrillation with RVR ([address]).     77-year-old female with past medical history of CAD s/p stents, A-fib on Eliquis, CKD stage III, diabetes mellitus with neuropathy, hypertension, hyperlipidemia, COPD not on baseline oxygen migraines, wheelchair-bound at baseline due to hip surgery presented from This continues to be a problem for this patient who is now seeing Dr. Guillen as his psychiatrist.  Patient was hospitalized for a week at [facility] because of suicidal ideation.  He previously was on Effexor.  His medications have recently been changed secondary to side effects.  He is now on mirtazapine 7.5 mg at bedtime to see if that can be helpful.  Patient completed his PHQ 9 and he scores a 9 which is an improvement from where he has been in the past.  Depression Screen (PHQ-2/PHQ-9) 6/13/2018 12/9/2019   PHQ-2 Total Score - 2   PHQ-2 Total Score 2 -   PHQ-9 Total Score - 9   PHQ-9 Total Score 10 -       Interpretation of PHQ-9 Total Score   Score Severity   1-4 No Depression   5-9 Mild Depression   10-14 Moderate Depression   15-19 Moderately Severe Depression   20-27 Severe Depression

## 2023-08-31 NOTE — PROGRESS NOTES
"New Patient Consult Note for Endocrinology  Referred by: TAMMIE Camejo    Reason for consult:   Male to female transgender    HPI:  Chris Larios is a 23 y.o. old patient     male to female transgender, here to establish care and for transgender hormone replacement therapy    Sje felt being in a \"wrong body\" since    Social support:  Mood:    Current therapy:        Goals of care:  1. Feminization:   - breast tissue growth  - change in body composition  - decrease in facial and body hair   - decrease of skin oiliness   - sexual desire  2. Cessation of morning erections  3. Voice training  3. Gender affirming surgery (\" top surgery\" - , \"bottom surgery\" - , facial/body hair removal, laryngoplasty (tracheal shave), vocal cord tightening/voice trianing, facial reconstruction, genital reconstruction)    Satisfaction:  -     Side effects/risk factors:  - decrease sexual desire  - VTE  - HTG  - cholelithiasis  - CVD  - CVA  - Hx of depression, suicidal ideation  - smoking  - FHx of breast cancer  - Hx of liver disease?      Partner:  Reproductive plan:  Reproductive history:  bisexual/heterosexual/homosexual    Screening:  PSA    Social history:   Smoking  Alcohol    FHx:  VTE -   Premature CAD -        Past Medical History:  Patient Active Problem List    Diagnosis Date Noted    High risk medication use 01/05/2021    Male-to-female transgender person 05/06/2020    Attention deficit hyperactivity disorder (ADHD), combined type 12/09/2019    History of epilepsy 06/13/2018    Current moderate episode of major depressive disorder without prior episode (HCC) 06/13/2018    Juvenile idiopathic scoliosis of thoracic region 06/13/2018       Past Surgical History:  Past Surgical History:   Procedure Laterality Date    PACEMAKER INSERTION Left 12/12/2008    Medtronic Adapta [id number] implanted in [place], [place].    TONSILLECTOMY AND ADENOIDECTOMY         Allergies:  Patient has no known allergies.    Social " History:  Social History     Socioeconomic History    Marital status: Single     Spouse name: Not on file    Number of children: Not on file    Years of education: Not on file    Highest education level: Not on file   Occupational History    Not on file   Tobacco Use    Smoking status: Never    Smokeless tobacco: Never   Vaping Use    Vaping Use: Never used   Substance and Sexual Activity    Alcohol use: No    Drug use: Yes     Types: Marijuana, Oral     Comment: rarely    Sexual activity: Never     Comment: single, Senior year HS   Other Topics Concern    Behavioral problems Not Asked    Interpersonal relationships Not Asked    Sad or not enjoying activities Not Asked    Suicidal thoughts Not Asked    Poor school performance Not Asked    Reading difficulties Not Asked    Speech difficulties Not Asked    Writing difficulties Not Asked    Inadequate sleep Not Asked    Excessive TV viewing Not Asked    Excessive video game use Not Asked    Inadequate exercise Not Asked    Sports related Not Asked    Poor diet Not Asked    Family concerns for drug/alcohol abuse Not Asked    Poor oral hygiene Not Asked    Bike safety Not Asked    Family concerns vehicle safety Not Asked   Social History Narrative    Not on file     Social Determinants of Health     Financial Resource Strain: Not on file   Food Insecurity: Not on file   Transportation Needs: Not on file   Physical Activity: Not on file   Stress: Not on file   Social Connections: Not on file   Intimate Partner Violence: Not on file   Housing Stability: Not on file       Family History:  Family History   Problem Relation Age of Onset    Psychiatric Illness Mother         bipolar and Kristen    Schizophrenia Mother     Alcohol/Drug Father         alcoholism    Alcohol abuse Father     Cancer Brother         leukemia    Diabetes Maternal Grandmother     Alcohol abuse Paternal Grandmother        Medications:  Current Outpatient Medications:     spironolactone (ALDACTONE) 50 MG  Tab, TAKE ONE TABLET BY MOUTH TWICE DAILY, Disp: 180 Tablet, Rfl: 0    estradiol (ESTRACE) 1 MG Tab, Take 4 Tablets by mouth every day., Disp: 120 Tablet, Rfl: 6    progesterone (PROMETRIUM) 100 MG Cap, Take 1 Capsule by mouth every day., Disp: 90 Capsule, Rfl: 3    amphetamine-dextroamphetamine (ADDERALL) 5 MG Tab, , Disp: , Rfl:     amphetamine-dextroamphetamine (ADDERALL) 10 MG Tab, , Disp: , Rfl:     fluoxetine (PROZAC) 40 MG capsule, , Disp: , Rfl:     Physical Examination:   Vital signs: There were no vitals taken for this visit.  General: No distress, cooperative, well dressed and well nourished.   Eyes: No scleral icterus or discharge, No hyposphagma  ENMT: Normal on external inspection of nose, lips, No nasal drainage   Neck: No abnormal masses on inspection  Resp: Normal effort, Bilateral clear to auscultation, No wheezing, No rales  CVS: Regular rate and rhythm, S1 S2 normal, No murmur. No gallop  Extremities: No edema bilateral extremities  Neuro: Alert and oriented  Skin: No rash, No Ulcers  Psych: Normal mood and affect    Labs:  - reviewed  Testosterone < 55  Estradiol 100 - 200      Imaging:  - reviewed    Assessment and Plan:  1. Male-to-female transgender person  - current therapy  - satisfaction:  - side effects:  - surgery plans:  - fertility plans:  - monitoring:    Goals:  Testosterone < 55  Estradiol 100 - 200       Monitoring:  Testosterone and estrogen levels  LFTs  Lipids   HbA1C  Mammograms as usual if breasts are intact      Plan:   Continue current hormonal regimen              Consult urology to consider orchiectomy              Smoking cessation         WITH TESTES:  Estrogens:  - estradiol 1 - 4 mg po qD  - estradiol valerate 10 - 40 mg IM [id number] (good for smokers)  - estradiol patch - 0.1 - 0.4 mg [id number] - less thrombogenic    2. Spironolactone 100 - 400 mg qd  3. Methylprogesterone acetate 5 -1 0 mg po    WO TESTES:  Estradiol 1 mg    OTHER:  Finasteride  - male pattern  baldness  GnRH agonists        RTC:    Total time (face-to-face and non-face-to face time):  min - discussion of diagnoses, treatment, prognosis, medical charts, lab, imaging, pathology review, documentation.      Plan reviewed with the patient and agreed with plan.  All questions answered to patient's satisfaction.  Thank you kindly for allowing me to participate in the care plan for this patient.    Ruby Villafuerte MD    CC:   TAMMIE Camejo

## 2023-09-07 ENCOUNTER — HOSPITAL ENCOUNTER (OUTPATIENT)
Dept: LAB | Facility: MEDICAL CENTER | Age: 23
End: 2023-09-07
Attending: INTERNAL MEDICINE
Payer: COMMERCIAL

## 2023-09-07 DIAGNOSIS — E55.9 VITAMIN D DEFICIENCY: ICD-10-CM

## 2023-09-07 DIAGNOSIS — Z78.9 MALE-TO-FEMALE TRANSGENDER PERSON: ICD-10-CM

## 2023-09-07 LAB
25(OH)D3 SERPL-MCNC: 37 NG/ML (ref 30–100)
ALBUMIN SERPL BCP-MCNC: 4.5 G/DL (ref 3.2–4.9)
ALBUMIN/GLOB SERPL: 1.8 G/DL
ALP SERPL-CCNC: 49 U/L (ref 30–99)
ALT SERPL-CCNC: 8 U/L (ref 2–50)
ANION GAP SERPL CALC-SCNC: 9 MMOL/L (ref 7–16)
AST SERPL-CCNC: 14 U/L (ref 12–45)
BILIRUB SERPL-MCNC: 0.3 MG/DL (ref 0.1–1.5)
BUN SERPL-MCNC: 16 MG/DL (ref 8–22)
CALCIUM ALBUM COR SERPL-MCNC: 8.6 MG/DL (ref 8.5–10.5)
CALCIUM SERPL-MCNC: 9 MG/DL (ref 8.5–10.5)
CHLORIDE SERPL-SCNC: 103 MMOL/L (ref 96–112)
CO2 SERPL-SCNC: 26 MMOL/L (ref 20–33)
CREAT SERPL-MCNC: 0.82 MG/DL (ref 0.5–1.4)
ESTRADIOL SERPL-MCNC: 125 PG/ML
GFR SERPLBLD CREATININE-BSD FMLA CKD-EPI: 127 ML/MIN/1.73 M 2
GLOBULIN SER CALC-MCNC: 2.5 G/DL (ref 1.9–3.5)
GLUCOSE SERPL-MCNC: 77 MG/DL (ref 65–99)
POTASSIUM SERPL-SCNC: 4.1 MMOL/L (ref 3.6–5.5)
PROT SERPL-MCNC: 7 G/DL (ref 6–8.2)
SODIUM SERPL-SCNC: 138 MMOL/L (ref 135–145)

## 2023-09-07 PROCEDURE — 82670 ASSAY OF TOTAL ESTRADIOL: CPT

## 2023-09-07 PROCEDURE — 80053 COMPREHEN METABOLIC PANEL: CPT

## 2023-09-07 PROCEDURE — 36415 COLL VENOUS BLD VENIPUNCTURE: CPT

## 2023-09-07 PROCEDURE — 82306 VITAMIN D 25 HYDROXY: CPT

## 2023-09-07 PROCEDURE — 84270 ASSAY OF SEX HORMONE GLOBUL: CPT

## 2023-09-07 PROCEDURE — 84402 ASSAY OF FREE TESTOSTERONE: CPT

## 2023-09-07 PROCEDURE — 84403 ASSAY OF TOTAL TESTOSTERONE: CPT

## 2023-09-09 LAB
SHBG SERPL-SCNC: 136 NMOL/L (ref 17–56)
TESTOST FREE MFR SERPL: 0.6 % (ref 1.6–2.9)
TESTOST FREE SERPL-MCNC: 21 PG/ML (ref 47–244)
TESTOST SERPL-MCNC: 330 NG/DL (ref 300–1080)

## 2023-09-27 DIAGNOSIS — Z78.9 MALE-TO-FEMALE TRANSGENDER PERSON: ICD-10-CM

## 2023-09-27 RX ORDER — ESTRADIOL 1 MG/1
4 TABLET ORAL DAILY
Qty: 120 TABLET | Refills: 6 | Status: SHIPPED | OUTPATIENT
Start: 2023-09-27 | End: 2023-10-24 | Stop reason: SDUPTHER

## 2023-10-07 DIAGNOSIS — Z78.9 MALE-TO-FEMALE TRANSGENDER PERSON: ICD-10-CM

## 2023-10-08 DIAGNOSIS — Z78.9 MALE-TO-FEMALE TRANSGENDER PERSON: ICD-10-CM

## 2023-10-08 RX ORDER — PROGESTERONE 100 MG/1
100 CAPSULE ORAL DAILY
Qty: 90 CAPSULE | Refills: 3 | Status: SHIPPED | OUTPATIENT
Start: 2023-10-08

## 2023-10-09 RX ORDER — PROGESTERONE 100 MG/1
100 CAPSULE ORAL DAILY
Qty: 90 CAPSULE | Refills: 0 | Status: SHIPPED | OUTPATIENT
Start: 2023-10-09 | End: 2023-12-28 | Stop reason: SDUPTHER

## 2023-10-24 DIAGNOSIS — Z78.9 MALE-TO-FEMALE TRANSGENDER PERSON: ICD-10-CM

## 2023-10-24 RX ORDER — ESTRADIOL 1 MG/1
4 TABLET ORAL DAILY
Qty: 120 TABLET | Refills: 6 | Status: SHIPPED | OUTPATIENT
Start: 2023-10-24 | End: 2023-11-19 | Stop reason: SDUPTHER

## 2023-11-19 DIAGNOSIS — Z78.9 MALE-TO-FEMALE TRANSGENDER PERSON: ICD-10-CM

## 2023-11-20 RX ORDER — ESTRADIOL 1 MG/1
4 TABLET ORAL DAILY
Qty: 120 TABLET | Refills: 6 | Status: SHIPPED | OUTPATIENT
Start: 2023-11-20 | End: 2024-01-18 | Stop reason: SDUPTHER

## 2023-11-24 DIAGNOSIS — Z79.899 HIGH RISK MEDICATION USE: ICD-10-CM

## 2023-11-26 RX ORDER — SPIRONOLACTONE 50 MG/1
50 TABLET, FILM COATED ORAL 2 TIMES DAILY
Qty: 180 TABLET | Refills: 0 | Status: SHIPPED | OUTPATIENT
Start: 2023-11-26 | End: 2024-03-04 | Stop reason: SDUPTHER

## 2023-12-13 ENCOUNTER — PATIENT MESSAGE (OUTPATIENT)
Dept: MEDICAL GROUP | Facility: LAB | Age: 23
End: 2023-12-13
Payer: COMMERCIAL

## 2023-12-14 DIAGNOSIS — F32.1 CURRENT MODERATE EPISODE OF MAJOR DEPRESSIVE DISORDER WITHOUT PRIOR EPISODE (HCC): ICD-10-CM

## 2023-12-14 DIAGNOSIS — F90.2 ATTENTION DEFICIT HYPERACTIVITY DISORDER (ADHD), COMBINED TYPE: ICD-10-CM

## 2023-12-28 DIAGNOSIS — Z78.9 MALE-TO-FEMALE TRANSGENDER PERSON: ICD-10-CM

## 2023-12-31 RX ORDER — PROGESTERONE 100 MG/1
100 CAPSULE ORAL DAILY
Qty: 90 CAPSULE | Refills: 0 | Status: SHIPPED | OUTPATIENT
Start: 2023-12-31 | End: 2024-01-02 | Stop reason: SDUPTHER

## 2024-01-02 DIAGNOSIS — Z78.9 MALE-TO-FEMALE TRANSGENDER PERSON: ICD-10-CM

## 2024-01-02 RX ORDER — PROGESTERONE 100 MG/1
100 CAPSULE ORAL DAILY
Qty: 90 CAPSULE | Refills: 0 | Status: SHIPPED | OUTPATIENT
Start: 2024-01-02 | End: 2024-03-16 | Stop reason: SDUPTHER

## 2024-01-18 DIAGNOSIS — Z78.9 MALE-TO-FEMALE TRANSGENDER PERSON: ICD-10-CM

## 2024-01-21 RX ORDER — ESTRADIOL 1 MG/1
4 TABLET ORAL DAILY
Qty: 120 TABLET | Refills: 6 | Status: SHIPPED | OUTPATIENT
Start: 2024-01-21 | End: 2024-02-19 | Stop reason: SDUPTHER

## 2024-02-19 DIAGNOSIS — Z78.9 MALE-TO-FEMALE TRANSGENDER PERSON: ICD-10-CM

## 2024-02-19 RX ORDER — ESTRADIOL 1 MG/1
4 TABLET ORAL DAILY
Qty: 120 TABLET | Refills: 6 | Status: SHIPPED | OUTPATIENT
Start: 2024-02-19 | End: 2024-02-21 | Stop reason: SDUPTHER

## 2024-02-21 DIAGNOSIS — Z78.9 MALE-TO-FEMALE TRANSGENDER PERSON: ICD-10-CM

## 2024-02-21 RX ORDER — ESTRADIOL 1 MG/1
4 TABLET ORAL DAILY
Qty: 120 TABLET | Refills: 6 | Status: SHIPPED
Start: 2024-02-21 | End: 2024-03-18

## 2024-02-23 ENCOUNTER — TELEMEDICINE (OUTPATIENT)
Dept: BEHAVIORAL HEALTH | Facility: CLINIC | Age: 24
End: 2024-02-23
Payer: COMMERCIAL

## 2024-02-23 DIAGNOSIS — F32.1 CURRENT MODERATE EPISODE OF MAJOR DEPRESSIVE DISORDER WITHOUT PRIOR EPISODE (HCC): ICD-10-CM

## 2024-02-23 DIAGNOSIS — F90.2 ATTENTION DEFICIT HYPERACTIVITY DISORDER (ADHD), COMBINED TYPE: ICD-10-CM

## 2024-02-23 PROCEDURE — 99204 OFFICE O/P NEW MOD 45 MIN: CPT | Performed by: PSYCHIATRY & NEUROLOGY

## 2024-02-23 RX ORDER — FLUOXETINE HYDROCHLORIDE 40 MG/1
40 CAPSULE ORAL DAILY
Qty: 90 CAPSULE | Refills: 1 | Status: SHIPPED | OUTPATIENT
Start: 2024-02-23

## 2024-02-23 RX ORDER — BUPROPION HYDROCHLORIDE 150 MG/1
150 TABLET ORAL EVERY MORNING
Qty: 30 TABLET | Refills: 1 | Status: SHIPPED | OUTPATIENT
Start: 2024-02-23

## 2024-02-23 NOTE — PROGRESS NOTES
"This evaluation was conducted via Zoom using secure and encrypted videoconferencing technology. The patient was in their home in the [facility].    The patient's identity was confirmed and verbal consent was obtained for this virtual visit.        INITIAL PSYCHIATRIC EVALUATION      This provider informed the patient their medical records are totally confidential except for the use by other providers involved in their care, or if the patient signs a release, or to report instances of child or elder abuse, or if it is determined they are an immediate risk to harm themselves or others.      CHIEF COMPLAINT  \"My old psychiatrist is giving prozac\"      HISTORY OF PRESENT ILLNESS  Chris Larios is a 23 y.o. old adult comes in today to establish care and for evaluation of depression, anxiety and ADHD.  I did reviewed all outpatient psychiatry follow up notes over last 3 years. Patient is new to the clinic.    Patient is on Prozac 40 mg daily X 1 year for mood and anxiety.  Past history of severe depression with multiple admissions with suicide attempt.  Prozac has helped a lot and for the last 2 years patient feels depression is stable with no suicidal ideations.  Currently describes mood and anxiety is stable on Prozac.    Diagnosed with ADHD in 2019 by prior psychiatrist: inattention, hyperactivity, easily distractible.   Now complaining of both inattention and hyperactivity symptoms on a daily basis.  Cannabis use frequently. Not interested in stopping the cannabis.  Agreed with not using stimulant class with the cannabis use and agreed with trial of Wellbutrin as discussed below.    Family history of bipolar disorder in mother but patient is not meeting criteria for current or past history of preston, hypomania or psychosis.    Currently patient is on gender affirming therapy with endocrinology on estradiol tablets 4 mg daily, spironolactone 100 mg daily and progesterone 100 mg daily.      PSYCHIATRIC REVIEW " "OF SYSTEMS: denies manic symptoms, denies psychotic symptoms including AH / VH, see HPI for depressive symptoms, and see HPI for anxeity symptoms      MEDICAL REVIEW OF SYSTEMS:   Constitutional negative   Eyes negative   Ears/Nose/Mouth/Throat negative   Cardiovascular negative   Respiratory negative   Gastrointestinal negative   Genitourinary negative   Muscular negative   Integumentary negative   Neurological negative   Endocrine negative   Hematologic/Lymphatic negative     CURRENT MEDICATIONS:  Current Outpatient Medications   Medication Sig Dispense Refill    estradiol (ESTRACE) 1 MG Tab Take 4 Tablets by mouth every day. 120 Tablet 6    progesterone (PROMETRIUM) 100 MG Cap Take 1 Capsule by mouth every day. 90 Capsule 0    spironolactone (ALDACTONE) 50 MG Tab Take 1 Tablet by mouth 2 times a day. 180 Tablet 0    progesterone (PROMETRIUM) 100 MG Cap Take 1 Capsule by mouth every day. 90 Capsule 3    amphetamine-dextroamphetamine (ADDERALL) 5 MG Tab       amphetamine-dextroamphetamine (ADDERALL) 10 MG Tab       fluoxetine (PROZAC) 40 MG capsule        No current facility-administered medications for this visit.       ALLERGIES:  Patient has no known allergies.    PAST PSYCHIATRIC HISTORY  Prior psychiatric hospitalization: 4 admissions in past for depression & SA.  Prior Diagnosis: MDD, LEE, ADHD    PAST PSYCHIATRIC MEDICATIONS  Prozac, Zoloft  Adderall     FAMILY HISTORY  Mother: depression with ?bipolar disorder  Aunt: committed suicide    SUBSTANCE USE HISTORY:  Cannabis:frequent use       MEDICAL HISTORY  Past Medical History:   Diagnosis Date    Anxiety and depression     Attention deficit hyperactivity disorder (ADHD), combined type 12/9/2019    Bowel habit changes     constipation    Childhood absence epilepsy (HCC)     treated with \"generic depakote\" per mom    Current moderate episode of major depressive disorder without prior episode (HCC)     Grand mal     last was 2014    History of epilepsy 2008    " Patient was diagnosed at age 8 with petit mall seizures treated with Depakote now has a clean EEGsince     Juvenile idiopathic scoliosis of thoracic region     Male-to-female transgender person 2020    OCD (obsessive compulsive disorder)     Pacemaker     Sinus pause 2008    Status post pacemaker placement.     Past Surgical History:   Procedure Laterality Date    PACEMAKER INSERTION Left 2008    Medtronic Adapta [id number] implanted in [place], [place].    TONSILLECTOMY AND ADENOIDECTOMY           PHYSICAL EXAMINAION:  Vital signs: There were no vitals taken for this visit.  Musculoskeletal: Normal gait.   Abnormal movements: none    MENTAL STATUS EXAMINATION      General:   - Grooming and hygiene: Casual,   - Apparent distress: none,   - Behavior: Calm  - Eye Contact:  Good,   - no psychomotor agitation or retardation    - Participation: Active verbal participation  Orientation: Alert and Fully Oriented to person, place and time  Mood: Euthymic  Affect: Flexible and Full range,  Thought Process: Logical and Goal-directed  Thought Content: Denies suicidal or homicidal ideations, intent or plan   Perception: Denies auditory or visual hallucinations. No delusions noted   Attention span and concentration: Intact   Speech:Rate within normal limits and Volume within normal limits  Language: Appropriate   Insight: Good  Judgment: Good  Recent and remote memory: No gross evidence of memory deficits      DEPRESSION SCREENIN/9/2021    10:04 AM 2022    11:30 AM 2022     3:00 PM   Depression Screen (PHQ-2/PHQ-9)   PHQ-2 Total Score 4 0 0   PHQ-9 Total Score 15         Interpretation of PHQ-9 Total Score   Score Severity   1-4 No Depression   5-9 Mild Depression   10-14 Moderate Depression   15-19 Moderately Severe Depression   20-27 Severe Depression      SAFETY ASSESSMENT - SELF:    Does patient acknowledge current or past symptoms of dangerousness to self? no  History of suicide by  family member: no  History of suicide by friend/significant other: no  Recent change in amount/specificity/intensity of suicidal thoughts or self-harm behavior? no  Current access to firearms, medications, or other identified means of suicide/self-harm? no  Protective factors present: family       SAFETY ASSESSMENT - OTHERS:    Does patient acknowledge current or past symptoms of aggressive behavior or risk to others? no  Recent change in amount/specificity/intensity of thoughts or threats to harm others? no  Current access to firearms/other identified means of harm? no      CURRENT RISK:       Suicidal: Low       Homicidal: Low       Self-Harm: Low       Relapse: Low       Crisis Safety Plan Reviewed Not Indicated    MEDICAL RECORDS/LABS/DIAGNOSTIC TESTS REVIEWED:  Component      Latest Ref Rng 9/7/2023   Testosterone,Total      300 - 1080 ng/dL 330    Sex Hormone Bind Globulin      17 - 56 nmol/L 136 (H)    Free Testosterone      47 - 244 pg/mL 21 (L)    Testosterone % Free      1.6 - 2.9 % 0.6 (L)       Component      Latest Ref Rng 9/7/2023   Sodium      135 - 145 mmol/L 138    Potassium      3.6 - 5.5 mmol/L 4.1    Chloride      96 - 112 mmol/L 103    Co2      20 - 33 mmol/L 26    Anion Gap      7.0 - 16.0  9.0    Glucose      65 - 99 mg/dL 77    Bun      8 - 22 mg/dL 16    Creatinine      0.50 - 1.40 mg/dL 0.82    Calcium      8.5 - 10.5 mg/dL 9.0    AST(SGOT)      12 - 45 U/L 14    ALT(SGPT)      2 - 50 U/L 8    Alkaline Phosphatase      30 - 99 U/L 49    Total Bilirubin      0.1 - 1.5 mg/dL 0.3    Albumin      3.2 - 4.9 g/dL 4.5    Total Protein      6.0 - 8.2 g/dL 7.0    Globulin      1.9 - 3.5 g/dL 2.5    A-G Ratio      g/dL 1.8    Correct Calcium      8.5 - 10.5 mg/dL 8.6        [place]  records -   Reviewed      PLAN:  (1) MDD; (2) ADHD  Continue Prozac 40 mg daily for depression management.  Add Wellbutrin  mg daily for depression and ADHD management.  Medication options, alternatives (including no  medications) and medication risks/benefits/side effects were discussed in detail.  Explained importance of contraceptive measures while on psychotropic medications, educated to let provider know if ever pregnant or wanting to become pregnant. Verbalized understanding.  The patient was advised to call, message provider on MyChart, or come in to the clinic if symptoms worsen or if any future questions/issues regarding their medications arise; the patient verbalized understanding and agreement.    The patient was educated to call 911, call the suicide hotline, or go to local ER if having thoughts of suicide or homicide; verbalized understanding.        Return to clinic in 1 month or sooner if symptoms worsen.  Next Appointment:  instruction provided on how to make the next appointment.     The proposed treatment plan was discussed with the patient who was provided the opportunity to ask questions and make suggestions regarding alternative treatment. Patient verbalized understanding and expressed agreement with the plan.     Thank you for allowing me to participate in the care of this patient.    Pierre Finley M.D.  02/23/24    CC:   JOSE L [email or web address].    This note was created using voice recognition software (Dragon). The accuracy of the dictation is limited by the abilities of the software. I have reviewed the note prior to signing, however some errors in grammar and context are still possible. If you have any questions related to this note please do not hesitate to contact our office.

## 2024-03-01 ENCOUNTER — HOSPITAL ENCOUNTER (OUTPATIENT)
Dept: LAB | Facility: MEDICAL CENTER | Age: 24
End: 2024-03-01
Attending: INTERNAL MEDICINE
Payer: COMMERCIAL

## 2024-03-01 DIAGNOSIS — E55.9 VITAMIN D DEFICIENCY: ICD-10-CM

## 2024-03-01 DIAGNOSIS — Z78.9 MALE-TO-FEMALE TRANSGENDER PERSON: ICD-10-CM

## 2024-03-01 DIAGNOSIS — Z79.899 HIGH RISK MEDICATION USE: ICD-10-CM

## 2024-03-01 LAB
25(OH)D3 SERPL-MCNC: 32 NG/ML (ref 30–100)
ALBUMIN SERPL BCP-MCNC: 4.5 G/DL (ref 3.2–4.9)
ALBUMIN/GLOB SERPL: 1.7 G/DL
ALP SERPL-CCNC: 52 U/L (ref 30–99)
ALT SERPL-CCNC: 9 U/L (ref 2–50)
ANION GAP SERPL CALC-SCNC: 12 MMOL/L (ref 7–16)
AST SERPL-CCNC: 18 U/L (ref 12–45)
BILIRUB SERPL-MCNC: 0.4 MG/DL (ref 0.1–1.5)
BUN SERPL-MCNC: 14 MG/DL (ref 8–22)
CALCIUM ALBUM COR SERPL-MCNC: 8.9 MG/DL (ref 8.5–10.5)
CALCIUM SERPL-MCNC: 9.3 MG/DL (ref 8.5–10.5)
CHLORIDE SERPL-SCNC: 103 MMOL/L (ref 96–112)
CO2 SERPL-SCNC: 24 MMOL/L (ref 20–33)
CREAT SERPL-MCNC: 0.98 MG/DL (ref 0.5–1.4)
ESTRADIOL SERPL-MCNC: 106 PG/ML
GFR SERPLBLD CREATININE-BSD FMLA CKD-EPI: 111 ML/MIN/1.73 M 2
GLOBULIN SER CALC-MCNC: 2.7 G/DL (ref 1.9–3.5)
GLUCOSE SERPL-MCNC: 90 MG/DL (ref 65–99)
POTASSIUM SERPL-SCNC: 4 MMOL/L (ref 3.6–5.5)
PROT SERPL-MCNC: 7.2 G/DL (ref 6–8.2)
SODIUM SERPL-SCNC: 139 MMOL/L (ref 135–145)

## 2024-03-01 PROCEDURE — 80053 COMPREHEN METABOLIC PANEL: CPT

## 2024-03-01 PROCEDURE — 82306 VITAMIN D 25 HYDROXY: CPT

## 2024-03-01 PROCEDURE — 84402 ASSAY OF FREE TESTOSTERONE: CPT

## 2024-03-01 PROCEDURE — 82670 ASSAY OF TOTAL ESTRADIOL: CPT

## 2024-03-01 PROCEDURE — 84403 ASSAY OF TOTAL TESTOSTERONE: CPT

## 2024-03-01 PROCEDURE — 36415 COLL VENOUS BLD VENIPUNCTURE: CPT

## 2024-03-01 PROCEDURE — 84270 ASSAY OF SEX HORMONE GLOBUL: CPT

## 2024-03-04 DIAGNOSIS — Z79.899 HIGH RISK MEDICATION USE: ICD-10-CM

## 2024-03-04 RX ORDER — SPIRONOLACTONE 50 MG/1
50 TABLET, FILM COATED ORAL 2 TIMES DAILY
Qty: 180 TABLET | Refills: 0 | Status: SHIPPED | OUTPATIENT
Start: 2024-03-04 | End: 2024-03-18 | Stop reason: SDUPTHER

## 2024-03-05 LAB
SHBG SERPL-SCNC: 142 NMOL/L (ref 17–56)
TESTOST FREE MFR SERPL: 0.6 % (ref 1.6–2.9)
TESTOST FREE SERPL-MCNC: 19 PG/ML (ref 47–244)
TESTOST SERPL-MCNC: 310 NG/DL (ref 300–1080)

## 2024-03-16 DIAGNOSIS — Z78.9 MALE-TO-FEMALE TRANSGENDER PERSON: ICD-10-CM

## 2024-03-18 ENCOUNTER — TELEPHONE (OUTPATIENT)
Dept: PHARMACY | Facility: MEDICAL CENTER | Age: 24
End: 2024-03-18

## 2024-03-18 ENCOUNTER — OFFICE VISIT (OUTPATIENT)
Dept: ENDOCRINOLOGY | Facility: MEDICAL CENTER | Age: 24
End: 2024-03-18
Attending: INTERNAL MEDICINE
Payer: COMMERCIAL

## 2024-03-18 VITALS
OXYGEN SATURATION: 99 % | SYSTOLIC BLOOD PRESSURE: 118 MMHG | WEIGHT: 139 LBS | DIASTOLIC BLOOD PRESSURE: 60 MMHG | BODY MASS INDEX: 21.07 KG/M2 | HEART RATE: 83 BPM | HEIGHT: 68 IN

## 2024-03-18 DIAGNOSIS — Z78.9 MALE-TO-FEMALE TRANSGENDER PERSON: ICD-10-CM

## 2024-03-18 DIAGNOSIS — Z79.899 HIGH RISK MEDICATION USE: ICD-10-CM

## 2024-03-18 DIAGNOSIS — E55.9 VITAMIN D DEFICIENCY: ICD-10-CM

## 2024-03-18 PROCEDURE — 3074F SYST BP LT 130 MM HG: CPT | Performed by: INTERNAL MEDICINE

## 2024-03-18 PROCEDURE — 99211 OFF/OP EST MAY X REQ PHY/QHP: CPT | Performed by: INTERNAL MEDICINE

## 2024-03-18 PROCEDURE — 99214 OFFICE O/P EST MOD 30 MIN: CPT | Performed by: INTERNAL MEDICINE

## 2024-03-18 PROCEDURE — 3078F DIAST BP <80 MM HG: CPT | Performed by: INTERNAL MEDICINE

## 2024-03-18 RX ORDER — SYRINGE W-NEEDLE,DISPOSAB,3 ML 25GX5/8"
1 SYRINGE, EMPTY DISPOSABLE MISCELLANEOUS
Qty: 100 EACH | Refills: 3 | Status: SHIPPED
Start: 2024-03-18 | End: 2024-03-21

## 2024-03-18 RX ORDER — PROGESTERONE 100 MG/1
100 CAPSULE ORAL DAILY
Qty: 90 CAPSULE | Refills: 0 | Status: SHIPPED | OUTPATIENT
Start: 2024-03-18

## 2024-03-18 RX ORDER — SPIRONOLACTONE 50 MG/1
50 TABLET, FILM COATED ORAL 2 TIMES DAILY
Qty: 180 TABLET | Refills: 3 | Status: SHIPPED | OUTPATIENT
Start: 2024-03-18 | End: 2024-03-20 | Stop reason: SDUPTHER

## 2024-03-18 ASSESSMENT — FIBROSIS 4 INDEX: FIB4 SCORE: 0.42

## 2024-03-18 NOTE — PROGRESS NOTES
Chief Complaint: Follow up for  Gender Dysphoria, Male to Female Transgender.     HPI:     Chris Lariso is a 23 y.o. adult here for follow up of gender dysphoria and male to female transgender    She was previously seen by another endocrinologist   Comorbid conditions include ADHD and depression    Currently she is on gender affirming therapy with estradiol tablets 4 mg daily, spironolactone 100 mg daily and progesterone 100 mg daily    She reports that she is very happy with the results of hormone therapy including development of breast tissue  She informed today that she is interested in trying subcutaneous estrogen  She denies depression and suicidal ideation  She is a non-smoker  She denies a history of blood clots or DVT        Her total testosterone is still relatively high but this is because of the increase in sex hormone binding protein.  Her actual free testosterone is low.  Her sex hormone binding protein is high because of the effects of estrogen    Her potassium is also within normal limits       Latest Reference Range & Units 03/01/24 12:42   25-Hydroxy   Vitamin D 25 30 - 100 ng/mL 32   Estradiol-E2 pg/mL 106.0   Free Testosterone 47 - 244 pg/mL 19 (L)   Sex Hormone Bind Globulin 17 - 56 nmol/L 142 (H)   Testosterone % Free 1.6 - 2.9 % 0.6 (L)   Testosterone,Total 300 - 1080 ng/dL 310       Patient's medications, allergies, and social histories were reviewed and updated as appropriate.      ROS:       CONS:     No fever, no chills   EYES:     No diplopia, no blurry vision   CV:           No chest pain, no palpitations   PULM:     No SOB, no cough, no hemoptysis.   GI:            No nausea, no vomiting, no diarrhea, no constipation   ENDO:     No polyuria, no polydipsia, no heat intolerance, no cold intolerance     Past Medical History:  Problem List:  2021-01: High risk medication use  2020-05: Male-to-female transgender person  2019-12: Attention deficit hyperactivity disorder (ADHD),  "combined   type  2018-08: Sick sinus syndrome (HCC)  2018-06: Cardiac pacemaker in situ  2018-06: History of epilepsy  2018-06: Current moderate episode of major depressive disorder   without prior episode (HCC)  2018-06: Juvenile idiopathic scoliosis of thoracic region      Past Surgical History:  Past Surgical History:   Procedure Laterality Date    PACEMAKER INSERTION Left 12/12/2008    Medtronic Adapta ADDR01 implanted in [place], [place].    TONSILLECTOMY AND ADENOIDECTOMY          Allergies:  Patient has no known allergies.     Social History:  Social History     Tobacco Use    Smoking status: Never    Smokeless tobacco: Never   Vaping Use    Vaping Use: Never used   Substance Use Topics    Alcohol use: No    Drug use: Yes     Types: Marijuana, Oral     Comment: rarely        Family History:   family history includes Alcohol abuse in her father and paternal grandmother; Alcohol/Drug in her father; Cancer in her brother; Diabetes in her maternal grandmother; Psychiatric Illness in her mother; Schizophrenia in her mother.      PHYSICAL EXAM:   Vital signs: /60 (BP Location: Right arm, Patient Position: Sitting, BP Cuff Size: Adult)   Pulse 83   Ht 1.727 m (5' 8\")   Wt 63 kg (139 lb)   SpO2 99%   BMI 21.13 kg/m²   GENERAL: Well-developed, well-nourished in no apparent distress.   EYE:  No ocular asymmetry, PERRLA  HENT: Pink, moist mucous membranes.    NECK: No thyromegaly.   CARDIOVASCULAR:  No murmurs  LUNGS: Clear breath sounds  ABDOMEN: Soft, nontender   EXTREMITIES: No clubbing, cyanosis, or edema.   NEUROLOGICAL: No gross focal motor abnormalities   LYMPH: No cervical adenopathy seen   SKIN: No rashes, lesions.       Labs:  Lab Results   Component Value Date/Time    WBC 5.1 09/14/2022 08:42 AM    RBC 5.28 09/14/2022 08:42 AM    HEMOGLOBIN 16.0 09/14/2022 08:42 AM    MCV 91.1 09/14/2022 08:42 AM    MCH 30.3 09/14/2022 08:42 AM    MCHC 33.3 (L) 09/14/2022 08:42 AM    RDW 43.2 09/14/2022 08:42 AM    " MPV 9.4 09/14/2022 08:42 AM       Lab Results   Component Value Date/Time    SODIUM 139 03/01/2024 12:42 PM    POTASSIUM 4.0 03/01/2024 12:42 PM    CHLORIDE 103 03/01/2024 12:42 PM    CO2 24 03/01/2024 12:42 PM    ANION 12.0 03/01/2024 12:42 PM    GLUCOSE 90 03/01/2024 12:42 PM    BUN 14 03/01/2024 12:42 PM    CREATININE 0.98 03/01/2024 12:42 PM    CREATININE 0.6 11/04/2008 08:05 AM    CALCIUM 9.3 03/01/2024 12:42 PM    ASTSGOT 18 03/01/2024 12:42 PM    ALTSGPT 9 03/01/2024 12:42 PM    TBILIRUBIN 0.4 03/01/2024 12:42 PM    ALBUMIN 4.5 03/01/2024 12:42 PM    TOTPROTEIN 7.2 03/01/2024 12:42 PM    GLOBULIN 2.7 03/01/2024 12:42 PM    AGRATIO 1.7 03/01/2024 12:42 PM       Lab Results   Component Value Date/Time    TSHULTRASEN 1.630 01/10/2020 0826     No results found for: FREEDIR  No results found for: FREET3  No results found for: THYSTIMIG      Imaging:    ASSESSMENT/PLAN:     1. Male-to-female transgender person   Stable  Her estrogen is at goal free testosterone is low  We will stop estrogen tablets and start estrogen cypionate 5 mg every week  Explained to patient that this is still subject to approval by her insurance and if this is not approved by her insurance she needs to go back to estradiol tablets  Continue spironolactone 100 mg daily  Continue progesterone 100 mg daily  Follow-up in 6 months with labs this week and prior       2. Vitamin D deficiency  Stable   Vitamin D labs were reviewed with patient  Continue current supplements  Continue monitoring levels       3. High risk medication use  Patient is going to restart estrogen and spironolactone which are high risk medications      Return in about 6 months (around 9/18/2024).      Thank you kindly for allowing me to participate in the endocrine care plan for this patient.    Sekou Duenas MD, FACE, N      CC:   TAMMIE Camejo

## 2024-03-18 NOTE — TELEPHONE ENCOUNTER
progesterone (PROMETRIUM) 100 MG Cap     Received Renewal PA request via [employer]  for PROMETRIUM. (Quantity:90, Day Supply:90)     Insurance: [facility]  Member ID:  [id number]  BIN: [id number]  PCN: [id number]  Group: [id number]     Ran Test claim via [employer] [facility] medication Pays for a $0 copay. Will outreach to patient to offer specialty pharmacy services and or release to preferred pharmacy

## 2024-03-19 DIAGNOSIS — Z78.9 MALE-TO-FEMALE TRANSGENDER PERSON: ICD-10-CM

## 2024-03-19 RX ORDER — ESTRADIOL 2 MG/1
6 TABLET ORAL DAILY
Qty: 90 TABLET | Refills: 1 | Status: SHIPPED | OUTPATIENT
Start: 2024-03-19

## 2024-03-20 DIAGNOSIS — Z79.899 HIGH RISK MEDICATION USE: ICD-10-CM

## 2024-03-20 RX ORDER — SPIRONOLACTONE 100 MG/1
100 TABLET, FILM COATED ORAL 2 TIMES DAILY
Qty: 180 TABLET | Refills: 1 | Status: SHIPPED | OUTPATIENT
Start: 2024-03-20

## 2024-03-20 NOTE — PROGRESS NOTES
I am discontinuing the estradiol cypionate because the patient cannot afford this.  I am switching her back to estradiol pills and increasing her dose from 4 mg to 6 mg daily because she is not happy with the 4 mg dose

## 2024-04-01 ENCOUNTER — APPOINTMENT (OUTPATIENT)
Dept: MEDICAL GROUP | Facility: LAB | Age: 24
End: 2024-04-01
Payer: COMMERCIAL

## 2024-04-13 DIAGNOSIS — Z78.9 MALE-TO-FEMALE TRANSGENDER PERSON: ICD-10-CM

## 2024-04-14 RX ORDER — ESTRADIOL 2 MG/1
6 TABLET ORAL DAILY
Qty: 90 TABLET | Refills: 1 | Status: SHIPPED | OUTPATIENT
Start: 2024-04-14

## 2024-05-13 DIAGNOSIS — Z79.899 HIGH RISK MEDICATION USE: ICD-10-CM

## 2024-05-13 DIAGNOSIS — Z78.9 MALE-TO-FEMALE TRANSGENDER PERSON: ICD-10-CM

## 2024-05-13 RX ORDER — SPIRONOLACTONE 100 MG/1
100 TABLET, FILM COATED ORAL 2 TIMES DAILY
Qty: 180 TABLET | Refills: 1 | Status: SHIPPED | OUTPATIENT
Start: 2024-05-13

## 2024-05-13 RX ORDER — ESTRADIOL 2 MG/1
6 TABLET ORAL DAILY
Qty: 90 TABLET | Refills: 1 | Status: SHIPPED | OUTPATIENT
Start: 2024-05-13

## 2024-06-12 DIAGNOSIS — Z78.9 MALE-TO-FEMALE TRANSGENDER PERSON: ICD-10-CM

## 2024-06-12 RX ORDER — ESTRADIOL 2 MG/1
6 TABLET ORAL DAILY
Qty: 90 TABLET | Refills: 1 | Status: SHIPPED | OUTPATIENT
Start: 2024-06-12 | End: 2024-06-13 | Stop reason: SDUPTHER

## 2024-06-13 DIAGNOSIS — Z78.9 MALE-TO-FEMALE TRANSGENDER PERSON: ICD-10-CM

## 2024-06-13 RX ORDER — ESTRADIOL 2 MG/1
6 TABLET ORAL DAILY
Qty: 90 TABLET | Refills: 1 | Status: SHIPPED | OUTPATIENT
Start: 2024-06-13 | End: 2024-06-17 | Stop reason: SDUPTHER

## 2024-06-17 DIAGNOSIS — Z78.9 MALE-TO-FEMALE TRANSGENDER PERSON: ICD-10-CM

## 2024-06-17 RX ORDER — ESTRADIOL 2 MG/1
6 TABLET ORAL DAILY
Qty: 90 TABLET | Refills: 1 | Status: SHIPPED | OUTPATIENT
Start: 2024-06-17 | End: 2024-06-17 | Stop reason: SDUPTHER

## 2024-06-17 RX ORDER — ESTRADIOL 2 MG/1
6 TABLET ORAL DAILY
Qty: 90 TABLET | Refills: 1 | Status: SHIPPED | OUTPATIENT
Start: 2024-06-17

## 2024-06-24 DIAGNOSIS — Z78.9 MALE-TO-FEMALE TRANSGENDER PERSON: ICD-10-CM

## 2024-06-24 RX ORDER — PROGESTERONE 100 MG/1
100 CAPSULE ORAL DAILY
Qty: 90 CAPSULE | Refills: 0 | Status: SHIPPED | OUTPATIENT
Start: 2024-06-24

## 2024-07-02 ENCOUNTER — OFFICE VISIT (OUTPATIENT)
Dept: URGENT CARE | Facility: CLINIC | Age: 24
End: 2024-07-02
Payer: COMMERCIAL

## 2024-07-02 VITALS
SYSTOLIC BLOOD PRESSURE: 108 MMHG | TEMPERATURE: 96.8 F | OXYGEN SATURATION: 99 % | HEIGHT: 68 IN | RESPIRATION RATE: 16 BRPM | DIASTOLIC BLOOD PRESSURE: 62 MMHG | WEIGHT: 142 LBS | BODY MASS INDEX: 21.52 KG/M2 | HEART RATE: 94 BPM

## 2024-07-02 DIAGNOSIS — J02.9 PHARYNGITIS, UNSPECIFIED ETIOLOGY: ICD-10-CM

## 2024-07-02 DIAGNOSIS — J06.9 VIRAL URI: ICD-10-CM

## 2024-07-02 LAB — S PYO DNA SPEC NAA+PROBE: NOT DETECTED

## 2024-07-02 PROCEDURE — 87651 STREP A DNA AMP PROBE: CPT | Performed by: NURSE PRACTITIONER

## 2024-07-02 PROCEDURE — 3074F SYST BP LT 130 MM HG: CPT | Performed by: NURSE PRACTITIONER

## 2024-07-02 PROCEDURE — 99213 OFFICE O/P EST LOW 20 MIN: CPT | Performed by: NURSE PRACTITIONER

## 2024-07-02 PROCEDURE — 3078F DIAST BP <80 MM HG: CPT | Performed by: NURSE PRACTITIONER

## 2024-07-02 ASSESSMENT — ENCOUNTER SYMPTOMS
HOARSE VOICE: 0
SWOLLEN GLANDS: 0
COUGH: 1
TROUBLE SWALLOWING: 0
FEVER: 0
CHILLS: 1
SORE THROAT: 1

## 2024-07-02 ASSESSMENT — FIBROSIS 4 INDEX: FIB4 SCORE: 0.42

## 2024-07-09 DIAGNOSIS — Z78.9 MALE-TO-FEMALE TRANSGENDER PERSON: ICD-10-CM

## 2024-07-09 RX ORDER — ESTRADIOL 2 MG/1
6 TABLET ORAL DAILY
Qty: 90 TABLET | Refills: 1 | Status: SHIPPED | OUTPATIENT
Start: 2024-07-09

## 2024-08-05 ENCOUNTER — APPOINTMENT (OUTPATIENT)
Dept: BEHAVIORAL HEALTH | Facility: CLINIC | Age: 24
End: 2024-08-05
Payer: COMMERCIAL

## 2024-08-15 ENCOUNTER — APPOINTMENT (OUTPATIENT)
Dept: MEDICAL GROUP | Facility: LAB | Age: 24
End: 2024-08-15
Payer: COMMERCIAL

## 2024-08-15 DIAGNOSIS — Z79.899 HIGH RISK MEDICATION USE: ICD-10-CM

## 2024-08-17 RX ORDER — SPIRONOLACTONE 100 MG/1
100 TABLET, FILM COATED ORAL 2 TIMES DAILY
Qty: 180 TABLET | Refills: 1 | Status: SHIPPED | OUTPATIENT
Start: 2024-08-17

## 2024-08-31 DIAGNOSIS — F32.1 CURRENT MODERATE EPISODE OF MAJOR DEPRESSIVE DISORDER WITHOUT PRIOR EPISODE (HCC): ICD-10-CM

## 2024-08-31 DIAGNOSIS — F90.2 ATTENTION DEFICIT HYPERACTIVITY DISORDER (ADHD), COMBINED TYPE: ICD-10-CM

## 2024-09-03 RX ORDER — BUPROPION HYDROCHLORIDE 150 MG/1
150 TABLET ORAL EVERY MORNING
Qty: 30 TABLET | Refills: 0 | Status: SHIPPED | OUTPATIENT
Start: 2024-09-03

## 2024-09-09 DIAGNOSIS — Z78.9 MALE-TO-FEMALE TRANSGENDER PERSON: ICD-10-CM

## 2024-09-09 RX ORDER — ESTRADIOL 2 MG/1
6 TABLET ORAL DAILY
Qty: 90 TABLET | Refills: 1 | Status: SHIPPED | OUTPATIENT
Start: 2024-09-09

## 2024-09-25 ENCOUNTER — TELEPHONE (OUTPATIENT)
Dept: ENDOCRINOLOGY | Facility: MEDICAL CENTER | Age: 24
End: 2024-09-25
Payer: COMMERCIAL

## 2024-09-30 ENCOUNTER — HOSPITAL ENCOUNTER (OUTPATIENT)
Dept: LAB | Facility: MEDICAL CENTER | Age: 24
End: 2024-09-30
Attending: INTERNAL MEDICINE
Payer: COMMERCIAL

## 2024-09-30 DIAGNOSIS — E55.9 VITAMIN D DEFICIENCY: ICD-10-CM

## 2024-09-30 DIAGNOSIS — Z79.899 HIGH RISK MEDICATION USE: ICD-10-CM

## 2024-09-30 DIAGNOSIS — Z78.9 MALE-TO-FEMALE TRANSGENDER PERSON: ICD-10-CM

## 2024-09-30 LAB
25(OH)D3 SERPL-MCNC: 35 NG/ML (ref 30–100)
ALBUMIN SERPL BCP-MCNC: 4.5 G/DL (ref 3.2–4.9)
ALBUMIN/GLOB SERPL: 1.7 G/DL
ALP SERPL-CCNC: 60 U/L (ref 30–99)
ALT SERPL-CCNC: 8 U/L (ref 2–50)
ANION GAP SERPL CALC-SCNC: 14 MMOL/L (ref 7–16)
AST SERPL-CCNC: 21 U/L (ref 12–45)
BILIRUB SERPL-MCNC: <0.2 MG/DL (ref 0.1–1.5)
BUN SERPL-MCNC: 17 MG/DL (ref 8–22)
CALCIUM ALBUM COR SERPL-MCNC: 8.9 MG/DL (ref 8.5–10.5)
CALCIUM SERPL-MCNC: 9.3 MG/DL (ref 8.5–10.5)
CHLORIDE SERPL-SCNC: 101 MMOL/L (ref 96–112)
CO2 SERPL-SCNC: 22 MMOL/L (ref 20–33)
CREAT SERPL-MCNC: 0.85 MG/DL (ref 0.5–1.4)
ESTRADIOL SERPL-MCNC: 454 PG/ML
GFR SERPLBLD CREATININE-BSD FMLA CKD-EPI: 124 ML/MIN/1.73 M 2
GLOBULIN SER CALC-MCNC: 2.7 G/DL (ref 1.9–3.5)
GLUCOSE SERPL-MCNC: 90 MG/DL (ref 65–99)
HCT VFR BLD AUTO: 44.7 % (ref 42–52)
HGB BLD-MCNC: 14.3 G/DL (ref 14–18)
POTASSIUM SERPL-SCNC: 4 MMOL/L (ref 3.6–5.5)
PROT SERPL-MCNC: 7.2 G/DL (ref 6–8.2)
SODIUM SERPL-SCNC: 137 MMOL/L (ref 135–145)

## 2024-09-30 PROCEDURE — 82306 VITAMIN D 25 HYDROXY: CPT

## 2024-09-30 PROCEDURE — 84403 ASSAY OF TOTAL TESTOSTERONE: CPT

## 2024-09-30 PROCEDURE — 82670 ASSAY OF TOTAL ESTRADIOL: CPT

## 2024-09-30 PROCEDURE — 84270 ASSAY OF SEX HORMONE GLOBUL: CPT

## 2024-09-30 PROCEDURE — 85018 HEMOGLOBIN: CPT

## 2024-09-30 PROCEDURE — 80053 COMPREHEN METABOLIC PANEL: CPT

## 2024-09-30 PROCEDURE — 36415 COLL VENOUS BLD VENIPUNCTURE: CPT

## 2024-09-30 PROCEDURE — 84402 ASSAY OF FREE TESTOSTERONE: CPT

## 2024-09-30 PROCEDURE — 85014 HEMATOCRIT: CPT

## 2024-10-01 ENCOUNTER — OFFICE VISIT (OUTPATIENT)
Dept: ENDOCRINOLOGY | Facility: MEDICAL CENTER | Age: 24
End: 2024-10-01
Attending: INTERNAL MEDICINE
Payer: COMMERCIAL

## 2024-10-01 ENCOUNTER — APPOINTMENT (OUTPATIENT)
Dept: BEHAVIORAL HEALTH | Facility: CLINIC | Age: 24
End: 2024-10-01
Payer: COMMERCIAL

## 2024-10-01 VITALS
WEIGHT: 138 LBS | HEIGHT: 68 IN | SYSTOLIC BLOOD PRESSURE: 118 MMHG | DIASTOLIC BLOOD PRESSURE: 70 MMHG | BODY MASS INDEX: 20.92 KG/M2 | HEART RATE: 62 BPM | OXYGEN SATURATION: 93 %

## 2024-10-01 DIAGNOSIS — F90.2 ATTENTION DEFICIT HYPERACTIVITY DISORDER (ADHD), COMBINED TYPE: ICD-10-CM

## 2024-10-01 DIAGNOSIS — F32.1 CURRENT MODERATE EPISODE OF MAJOR DEPRESSIVE DISORDER WITHOUT PRIOR EPISODE (HCC): ICD-10-CM

## 2024-10-01 DIAGNOSIS — E55.9 VITAMIN D DEFICIENCY: ICD-10-CM

## 2024-10-01 DIAGNOSIS — Z79.899 HIGH RISK MEDICATION USE: ICD-10-CM

## 2024-10-01 DIAGNOSIS — Z78.9 MALE-TO-FEMALE TRANSGENDER PERSON: ICD-10-CM

## 2024-10-01 PROCEDURE — 96127 BRIEF EMOTIONAL/BEHAV ASSMT: CPT | Performed by: PSYCHIATRY & NEUROLOGY

## 2024-10-01 PROCEDURE — 3074F SYST BP LT 130 MM HG: CPT | Performed by: INTERNAL MEDICINE

## 2024-10-01 PROCEDURE — 99214 OFFICE O/P EST MOD 30 MIN: CPT | Performed by: PSYCHIATRY & NEUROLOGY

## 2024-10-01 PROCEDURE — 99211 OFF/OP EST MAY X REQ PHY/QHP: CPT | Performed by: INTERNAL MEDICINE

## 2024-10-01 PROCEDURE — 99214 OFFICE O/P EST MOD 30 MIN: CPT | Performed by: INTERNAL MEDICINE

## 2024-10-01 PROCEDURE — 3078F DIAST BP <80 MM HG: CPT | Performed by: INTERNAL MEDICINE

## 2024-10-01 RX ORDER — BUPROPION HYDROCHLORIDE 300 MG/1
TABLET ORAL
Qty: 90 TABLET | Refills: 1 | Status: SHIPPED | OUTPATIENT
Start: 2024-10-01

## 2024-10-01 RX ORDER — FLUOXETINE 40 MG/1
40 CAPSULE ORAL DAILY
Qty: 90 CAPSULE | Refills: 1 | Status: SHIPPED | OUTPATIENT
Start: 2024-10-01

## 2024-10-01 RX ORDER — ESTRADIOL 2 MG/1
6 TABLET ORAL DAILY
Qty: 90 TABLET | Refills: 0 | Status: SHIPPED | OUTPATIENT
Start: 2024-10-01

## 2024-10-01 RX ORDER — SPIRONOLACTONE 100 MG/1
100 TABLET, FILM COATED ORAL 2 TIMES DAILY
Qty: 180 TABLET | Refills: 0 | Status: SHIPPED | OUTPATIENT
Start: 2024-10-01 | End: 2024-10-01 | Stop reason: SDUPTHER

## 2024-10-01 RX ORDER — PROGESTERONE 100 MG/1
100 CAPSULE ORAL DAILY
Qty: 90 CAPSULE | Refills: 0 | Status: SHIPPED | OUTPATIENT
Start: 2024-10-01 | End: 2024-10-01 | Stop reason: SDUPTHER

## 2024-10-01 ASSESSMENT — ANXIETY QUESTIONNAIRES
7. FEELING AFRAID AS IF SOMETHING AWFUL MIGHT HAPPEN: SEVERAL DAYS
4. TROUBLE RELAXING: MORE THAN HALF THE DAYS
2. NOT BEING ABLE TO STOP OR CONTROL WORRYING: SEVERAL DAYS
1. FEELING NERVOUS, ANXIOUS, OR ON EDGE: SEVERAL DAYS
3. WORRYING TOO MUCH ABOUT DIFFERENT THINGS: SEVERAL DAYS
GAD7 TOTAL SCORE: 8
5. BEING SO RESTLESS THAT IT IS HARD TO SIT STILL: MORE THAN HALF THE DAYS
6. BECOMING EASILY ANNOYED OR IRRITABLE: NOT AT ALL
5. BEING SO RESTLESS THAT IT IS HARD TO SIT STILL: MORE THAN HALF THE DAYS
3. WORRYING TOO MUCH ABOUT DIFFERENT THINGS: SEVERAL DAYS
2. NOT BEING ABLE TO STOP OR CONTROL WORRYING: SEVERAL DAYS
4. TROUBLE RELAXING: MORE THAN HALF THE DAYS
7. FEELING AFRAID AS IF SOMETHING AWFUL MIGHT HAPPEN: SEVERAL DAYS
1. FEELING NERVOUS, ANXIOUS, OR ON EDGE: SEVERAL DAYS
6. BECOMING EASILY ANNOYED OR IRRITABLE: NOT AT ALL

## 2024-10-01 ASSESSMENT — PATIENT HEALTH QUESTIONNAIRE - PHQ9
7. TROUBLE CONCENTRATING ON THINGS, SUCH AS READING THE NEWSPAPER OR WATCHING TELEVISION: 1
6. FEELING BAD ABOUT YOURSELF - OR THAT YOU ARE A FAILURE OR HAVE LET YOURSELF OR YOUR FAMILY DOWN: SEVERAL DAYS
1. LITTLE INTEREST OR PLEASURE IN DOING THINGS: SEVERAL DAYS
2. FEELING DOWN, DEPRESSED, IRRITABLE, OR HOPELESS: 2
3. TROUBLE FALLING OR STAYING ASLEEP OR SLEEPING TOO MUCH: 2
SUM OF ALL RESPONSES TO PHQ QUESTIONS 1-9: 10
7. TROUBLE CONCENTRATING ON THINGS, SUCH AS READING THE NEWSPAPER OR WATCHING TELEVISION: SEVERAL DAYS
1. LITTLE INTEREST OR PLEASURE IN DOING THINGS: 1
5. POOR APPETITE OR OVEREATING: NOT AT ALL
9. THOUGHTS THAT YOU WOULD BE BETTER OFF DEAD, OR OF HURTING YOURSELF: SEVERAL DAYS
5. POOR APPETITE OR OVEREATING: 0
4. FEELING TIRED OR HAVING LITTLE ENERGY: SEVERAL DAYS
CLINICAL INTERPRETATION OF PHQ2 SCORE: 0
10. IF YOU CHECKED OFF ANY PROBLEMS, HOW DIFFICULT HAVE THESE PROBLEMS MADE IT FOR YOU TO DO YOUR WORK, TAKE CARE OF THINGS AT HOME, OR GET ALONG WITH OTHER PEOPLE: NOT DIFFICULT AT ALL
3. TROUBLE FALLING OR STAYING ASLEEP OR SLEEPING TOO MUCH: MORE THAN HALF THE DAYS
8. MOVING OR SPEAKING SO SLOWLY THAT OTHER PEOPLE COULD HAVE NOTICED. OR THE OPPOSITE, BEING SO FIGETY OR RESTLESS THAT YOU HAVE BEEN MOVING AROUND A LOT MORE THAN USUAL: 1
2. FEELING DOWN, DEPRESSED, IRRITABLE, OR HOPELESS: MORE THAN HALF THE DAYS
4. FEELING TIRED OR HAVING LITTLE ENERGY: 1
9. THOUGHTS THAT YOU WOULD BE BETTER OFF DEAD, OR OF HURTING YOURSELF: 1
5. POOR APPETITE OR OVEREATING: 0 - NOT AT ALL
8. MOVING OR SPEAKING SO SLOWLY THAT OTHER PEOPLE COULD HAVE NOTICED. OR THE OPPOSITE, BEING SO FIGETY OR RESTLESS THAT YOU HAVE BEEN MOVING AROUND A LOT MORE THAN USUAL: SEVERAL DAYS
6. FEELING BAD ABOUT YOURSELF - OR THAT YOU ARE A FAILURE OR HAVE LET YOURSELF OR YOUR FAMILY DOWN: 1

## 2024-10-02 RX ORDER — PROGESTERONE 100 MG/1
100 CAPSULE ORAL DAILY
Qty: 90 CAPSULE | Refills: 0 | Status: SHIPPED | OUTPATIENT
Start: 2024-10-02

## 2024-10-02 RX ORDER — SPIRONOLACTONE 100 MG/1
100 TABLET, FILM COATED ORAL 2 TIMES DAILY
Qty: 180 TABLET | Refills: 0 | Status: SHIPPED | OUTPATIENT
Start: 2024-10-02

## 2024-10-04 LAB
SHBG SERPL-SCNC: 125 NMOL/L (ref 17–56)
TESTOST FREE SERPL-MCNC: 10 PG/ML (ref 47–244)
TESTOST SERPL-MCNC: 149 NG/DL (ref 300–1080)

## 2024-11-14 ENCOUNTER — APPOINTMENT (OUTPATIENT)
Dept: BEHAVIORAL HEALTH | Facility: CLINIC | Age: 24
End: 2024-11-14
Payer: COMMERCIAL

## 2024-11-14 ENCOUNTER — TELEMEDICINE (OUTPATIENT)
Dept: BEHAVIORAL HEALTH | Facility: CLINIC | Age: 24
End: 2024-11-14
Payer: COMMERCIAL

## 2024-11-14 DIAGNOSIS — F43.0 ACUTE STRESS DISORDER: ICD-10-CM

## 2024-11-14 DIAGNOSIS — F32.1 CURRENT MODERATE EPISODE OF MAJOR DEPRESSIVE DISORDER WITHOUT PRIOR EPISODE (HCC): ICD-10-CM

## 2024-11-14 PROCEDURE — 99215 OFFICE O/P EST HI 40 MIN: CPT | Mod: 95 | Performed by: PSYCHIATRY & NEUROLOGY

## 2024-11-14 PROCEDURE — 90833 PSYTX W PT W E/M 30 MIN: CPT | Mod: 95 | Performed by: PSYCHIATRY & NEUROLOGY

## 2024-11-14 RX ORDER — FLUOXETINE 40 MG/1
40 CAPSULE ORAL DAILY
Qty: 90 CAPSULE | Refills: 1 | Status: SHIPPED | OUTPATIENT
Start: 2024-11-14

## 2024-11-14 RX ORDER — FLUOXETINE 20 MG/1
20 TABLET, FILM COATED ORAL DAILY
Qty: 90 TABLET | Refills: 1 | Status: SHIPPED | OUTPATIENT
Start: 2024-11-14

## 2024-11-14 ASSESSMENT — PATIENT HEALTH QUESTIONNAIRE - PHQ9
5. POOR APPETITE OR OVEREATING: SEVERAL DAYS
6. FEELING BAD ABOUT YOURSELF - OR THAT YOU ARE A FAILURE OR HAVE LET YOURSELF OR YOUR FAMILY DOWN: SEVERAL DAYS
4. FEELING TIRED OR HAVING LITTLE ENERGY: MORE THAN HALF THE DAYS
6. FEELING BAD ABOUT YOURSELF - OR THAT YOU ARE A FAILURE OR HAVE LET YOURSELF OR YOUR FAMILY DOWN: 1
SUM OF ALL RESPONSES TO PHQ QUESTIONS 1-9: 12
8. MOVING OR SPEAKING SO SLOWLY THAT OTHER PEOPLE COULD HAVE NOTICED. OR THE OPPOSITE, BEING SO FIGETY OR RESTLESS THAT YOU HAVE BEEN MOVING AROUND A LOT MORE THAN USUAL: 1
CLINICAL INTERPRETATION OF PHQ2 SCORE: 0
8. MOVING OR SPEAKING SO SLOWLY THAT OTHER PEOPLE COULD HAVE NOTICED. OR THE OPPOSITE, BEING SO FIGETY OR RESTLESS THAT YOU HAVE BEEN MOVING AROUND A LOT MORE THAN USUAL: SEVERAL DAYS
5. POOR APPETITE OR OVEREATING: 1 - SEVERAL DAYS
7. TROUBLE CONCENTRATING ON THINGS, SUCH AS READING THE NEWSPAPER OR WATCHING TELEVISION: 3
2. FEELING DOWN, DEPRESSED, IRRITABLE, OR HOPELESS: SEVERAL DAYS
3. TROUBLE FALLING OR STAYING ASLEEP OR SLEEPING TOO MUCH: SEVERAL DAYS
1. LITTLE INTEREST OR PLEASURE IN DOING THINGS: SEVERAL DAYS
10. IF YOU CHECKED OFF ANY PROBLEMS, HOW DIFFICULT HAVE THESE PROBLEMS MADE IT FOR YOU TO DO YOUR WORK, TAKE CARE OF THINGS AT HOME, OR GET ALONG WITH OTHER PEOPLE: VERY DIFFICULT
2. FEELING DOWN, DEPRESSED, IRRITABLE, OR HOPELESS: 1
7. TROUBLE CONCENTRATING ON THINGS, SUCH AS READING THE NEWSPAPER OR WATCHING TELEVISION: NEARLY EVERY DAY
9. THOUGHTS THAT YOU WOULD BE BETTER OFF DEAD, OR OF HURTING YOURSELF: SEVERAL DAYS
4. FEELING TIRED OR HAVING LITTLE ENERGY: 2
9. THOUGHTS THAT YOU WOULD BE BETTER OFF DEAD, OR OF HURTING YOURSELF: 1
1. LITTLE INTEREST OR PLEASURE IN DOING THINGS: 1
5. POOR APPETITE OR OVEREATING: 1
3. TROUBLE FALLING OR STAYING ASLEEP OR SLEEPING TOO MUCH: 1

## 2024-11-14 ASSESSMENT — ANXIETY QUESTIONNAIRES
IF YOU CHECKED OFF ANY PROBLEMS ON THIS QUESTIONNAIRE, HOW DIFFICULT HAVE THESE PROBLEMS MADE IT FOR YOU TO DO YOUR WORK, TAKE CARE OF THINGS AT HOME, OR GET ALONG WITH OTHER PEOPLE: EXTREMELY DIFFICULT
4. TROUBLE RELAXING: NEARLY EVERY DAY
4. TROUBLE RELAXING: NEARLY EVERY DAY
3. WORRYING TOO MUCH ABOUT DIFFERENT THINGS: SEVERAL DAYS
2. NOT BEING ABLE TO STOP OR CONTROL WORRYING: MORE THAN HALF THE DAYS
2. NOT BEING ABLE TO STOP OR CONTROL WORRYING: MORE THAN HALF THE DAYS
6. BECOMING EASILY ANNOYED OR IRRITABLE: MORE THAN HALF THE DAYS
5. BEING SO RESTLESS THAT IT IS HARD TO SIT STILL: NEARLY EVERY DAY
5. BEING SO RESTLESS THAT IT IS HARD TO SIT STILL: NEARLY EVERY DAY
GAD7 TOTAL SCORE: 17
6. BECOMING EASILY ANNOYED OR IRRITABLE: MORE THAN HALF THE DAYS
1. FEELING NERVOUS, ANXIOUS, OR ON EDGE: NEARLY EVERY DAY
3. WORRYING TOO MUCH ABOUT DIFFERENT THINGS: SEVERAL DAYS
7. FEELING AFRAID AS IF SOMETHING AWFUL MIGHT HAPPEN: NEARLY EVERY DAY
7. FEELING AFRAID AS IF SOMETHING AWFUL MIGHT HAPPEN: NEARLY EVERY DAY
IF YOU CHECKED OFF ANY PROBLEMS ON THIS QUESTIONNAIRE, HOW DIFFICULT HAVE THESE PROBLEMS MADE IT FOR YOU TO DO YOUR WORK, TAKE CARE OF THINGS AT HOME, OR GET ALONG WITH OTHER PEOPLE: EXTREMELY DIFFICULT
1. FEELING NERVOUS, ANXIOUS, OR ON EDGE: NEARLY EVERY DAY
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
## 2024-11-18 DIAGNOSIS — F90.2 ATTENTION DEFICIT HYPERACTIVITY DISORDER (ADHD), COMBINED TYPE: ICD-10-CM

## 2024-11-18 DIAGNOSIS — F32.1 CURRENT MODERATE EPISODE OF MAJOR DEPRESSIVE DISORDER WITHOUT PRIOR EPISODE (HCC): ICD-10-CM

## 2024-11-18 NOTE — TELEPHONE ENCOUNTER
Received request via: Patient    Was the patient seen in the last year in this department? Yes    Does the patient have an active prescription (recently filled or refills available) for medication(s) requested? No    Pharmacy Name: [facility]     Does the patient have [facility] and need 100-day supply? (This applies to ALL medications) Patient does not have SCP

## 2024-11-19 RX ORDER — BUPROPION HYDROCHLORIDE 150 MG/1
150 TABLET ORAL EVERY MORNING
Qty: 30 TABLET | Refills: 0 | Status: SHIPPED | OUTPATIENT
Start: 2024-11-19

## 2024-11-19 RX ORDER — BUPROPION HYDROCHLORIDE 300 MG/1
TABLET ORAL
Qty: 90 TABLET | Refills: 1 | Status: SHIPPED | OUTPATIENT
Start: 2024-11-19

## 2024-12-11 ENCOUNTER — TELEMEDICINE (OUTPATIENT)
Dept: BEHAVIORAL HEALTH | Facility: CLINIC | Age: 24
End: 2024-12-11
Payer: COMMERCIAL

## 2024-12-11 DIAGNOSIS — F43.0 ACUTE STRESS DISORDER: ICD-10-CM

## 2024-12-11 DIAGNOSIS — G47.09 OTHER INSOMNIA: ICD-10-CM

## 2024-12-11 DIAGNOSIS — F32.2 MDD (MAJOR DEPRESSIVE DISORDER), SINGLE EPISODE, SEVERE , NO PSYCHOSIS (HCC): ICD-10-CM

## 2024-12-11 PROCEDURE — 99214 OFFICE O/P EST MOD 30 MIN: CPT | Mod: 95 | Performed by: PSYCHIATRY & NEUROLOGY

## 2024-12-11 PROCEDURE — 90833 PSYTX W PT W E/M 30 MIN: CPT | Mod: 95 | Performed by: PSYCHIATRY & NEUROLOGY

## 2024-12-11 ASSESSMENT — PATIENT HEALTH QUESTIONNAIRE - PHQ9
6. FEELING BAD ABOUT YOURSELF - OR THAT YOU ARE A FAILURE OR HAVE LET YOURSELF OR YOUR FAMILY DOWN: SEVERAL DAYS
7. TROUBLE CONCENTRATING ON THINGS, SUCH AS READING THE NEWSPAPER OR WATCHING TELEVISION: NEARLY EVERY DAY
SUM OF ALL RESPONSES TO PHQ QUESTIONS 1-9: 19
4. FEELING TIRED OR HAVING LITTLE ENERGY: NEARLY EVERY DAY
5. POOR APPETITE OR OVEREATING: NOT AT ALL
3. TROUBLE FALLING OR STAYING ASLEEP OR SLEEPING TOO MUCH: NEARLY EVERY DAY
SUM OF ALL RESPONSES TO PHQ9 QUESTIONS 1 AND 2: 6
1. LITTLE INTEREST OR PLEASURE IN DOING THINGS: NEARLY EVERY DAY
2. FEELING DOWN, DEPRESSED, IRRITABLE, OR HOPELESS: NEARLY EVERY DAY
9. THOUGHTS THAT YOU WOULD BE BETTER OFF DEAD, OR OF HURTING YOURSELF: NOT AT ALL
8. MOVING OR SPEAKING SO SLOWLY THAT OTHER PEOPLE COULD HAVE NOTICED. OR THE OPPOSITE, BEING SO FIGETY OR RESTLESS THAT YOU HAVE BEEN MOVING AROUND A LOT MORE THAN USUAL: NEARLY EVERY DAY
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
## 2024-12-19 ENCOUNTER — HOSPITAL ENCOUNTER (OUTPATIENT)
Dept: BEHAVIORAL HEALTH | Facility: MEDICAL CENTER | Age: 24
End: 2024-12-19
Attending: PSYCHIATRY & NEUROLOGY
Payer: COMMERCIAL

## 2024-12-19 DIAGNOSIS — F33.2 SEVERE EPISODE OF RECURRENT MAJOR DEPRESSIVE DISORDER, WITHOUT PSYCHOTIC FEATURES (HCC): ICD-10-CM

## 2024-12-19 DIAGNOSIS — F41.1 GAD (GENERALIZED ANXIETY DISORDER): ICD-10-CM

## 2024-12-19 PROCEDURE — 90791 PSYCH DIAGNOSTIC EVALUATION: CPT | Performed by: MARRIAGE & FAMILY THERAPIST

## 2024-12-19 ASSESSMENT — ANXIETY QUESTIONNAIRES
7. FEELING AFRAID AS IF SOMETHING AWFUL MIGHT HAPPEN: NEARLY EVERY DAY
4. TROUBLE RELAXING: NEARLY EVERY DAY
6. BECOMING EASILY ANNOYED OR IRRITABLE: MORE THAN HALF THE DAYS
1. FEELING NERVOUS, ANXIOUS, OR ON EDGE: NEARLY EVERY DAY
5. BEING SO RESTLESS THAT IT IS HARD TO SIT STILL: MORE THAN HALF THE DAYS
GAD7 TOTAL SCORE: 19
3. WORRYING TOO MUCH ABOUT DIFFERENT THINGS: NEARLY EVERY DAY
2. NOT BEING ABLE TO STOP OR CONTROL WORRYING: NEARLY EVERY DAY

## 2024-12-19 ASSESSMENT — PATIENT HEALTH QUESTIONNAIRE - PHQ9
CLINICAL INTERPRETATION OF PHQ2 SCORE: 4
SUM OF ALL RESPONSES TO PHQ QUESTIONS 1-9: 19
5. POOR APPETITE OR OVEREATING: 1 - SEVERAL DAYS
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
## 2024-12-23 ENCOUNTER — HOSPITAL ENCOUNTER (OUTPATIENT)
Dept: BEHAVIORAL HEALTH | Facility: MEDICAL CENTER | Age: 24
End: 2024-12-23
Attending: PSYCHIATRY & NEUROLOGY
Payer: COMMERCIAL

## 2024-12-23 DIAGNOSIS — F33.2 SEVERE RECURRENT MAJOR DEPRESSION WITHOUT PSYCHOTIC FEATURES (HCC): ICD-10-CM

## 2024-12-23 DIAGNOSIS — F41.1 GENERALIZED ANXIETY DISORDER: ICD-10-CM

## 2024-12-23 PROCEDURE — 90853 GROUP PSYCHOTHERAPY: CPT | Performed by: MARRIAGE & FAMILY THERAPIST

## 2024-12-24 NOTE — GROUP NOTE
Group Appointment Information    Date: 12/23/24   Attendance Duration: 60 minutes  Number of Participants: 0 participants  Program / Group: IOP - Intensive Outpatient Program  Topics Covered: Care in Relationships      Group Therapy Start Time:  3:30 PM    Attendance: Attended  Participation: Limited verbal participation    Affect/Mood Range: Constricted  Affect/Mood Display: Anxious  Cognition: Oriented    Evidence of imminent suicide risk: No   Evidence of imminent homicide risk: No     Therapeutic Interventions: Emotion clarification and Supportive psychotherapy  Progress Toward Treatment Goal: No change; pt did share out and appeared to feel very anxious.  
Group Appointment Information    Date: 12/23/24   Attendance Duration: 60 minutes  Number of Participants: 10 participants  Program / Group: IOP - Intensive Outpatient Program  Topics Covered: Mindful practice      Group Therapy Start Time:  1:30 PM    Attendance: Attended  Participation: Limited verbal participation    Affect/Mood Range: Constricted  Affect/Mood Display: CWC - Congruent w/Content  Cognition: Alert    Evidence of imminent suicide risk: No   Evidence of imminent homicide risk: No     Therapeutic Interventions: Psychoeducation and Cognitive clarification  Progress Toward Treatment Goal: No change; pt's learned skills of DBT to manage strong feelings and improve distress tolerance.   
Group Appointment Information    Date: 12/23/24   Attendance Duration: 60 minutes  Number of Participants: 10 participants  Program / Group: IOP - Intensive Outpatient Program  Topics Covered: Stress Management      Group Therapy Start Time:  2:30 PM    Attendance: Attended  Participation: Limited verbal participation    Affect/Mood Range: Constricted  Affect/Mood Display: CWC - Congruent w/Content  Cognition: Alert    Evidence of imminent suicide risk: No   Evidence of imminent homicide risk: No     Therapeutic Interventions: Emotion clarification and Supportive psychotherapy  Progress Toward Treatment Goal: No change; pt appeared to be very self-conscious and did not voluntarily share out.  And did respond when asked questions more directly.   
Attending Attestation (For Attendings USE Only)...

## 2024-12-26 ENCOUNTER — HOSPITAL ENCOUNTER (OUTPATIENT)
Dept: BEHAVIORAL HEALTH | Facility: MEDICAL CENTER | Age: 24
End: 2024-12-26
Attending: PSYCHIATRY & NEUROLOGY
Payer: COMMERCIAL

## 2024-12-26 DIAGNOSIS — F41.1 GENERALIZED ANXIETY DISORDER: ICD-10-CM

## 2024-12-26 DIAGNOSIS — F33.2 SEVERE RECURRENT MAJOR DEPRESSION WITHOUT PSYCHOTIC FEATURES (HCC): ICD-10-CM

## 2024-12-26 PROCEDURE — 90853 GROUP PSYCHOTHERAPY: CPT | Performed by: MARRIAGE & FAMILY THERAPIST

## 2024-12-26 NOTE — GROUP NOTE
Group Appointment Information    Date: 12/26/24   Attendance Duration: 60 minutes  Number of Participants: 8 participants  Program / Group: IOP - Intensive Outpatient Program  Topics Covered: Boundaries      Group Therapy Start Time:  1:30 PM    Attendance: Attended  Participation: Active verbal participation    Affect/Mood Range: Normal range  Affect/Mood Display: CWC - Congruent w/Content  Cognition: Alert and Oriented    Evidence of imminent suicide risk: No   Evidence of imminent homicide risk: No     Therapeutic Interventions: Psychoeducation and Cognitive clarification  Progress Toward Treatment Goal: Mild improvement; pt learned about interpersonal boundaries and how to set boundaries that promote mental and social well being.

## 2024-12-27 ENCOUNTER — HOSPITAL ENCOUNTER (OUTPATIENT)
Dept: BEHAVIORAL HEALTH | Facility: MEDICAL CENTER | Age: 24
End: 2024-12-27
Attending: PSYCHIATRY & NEUROLOGY
Payer: COMMERCIAL

## 2024-12-27 DIAGNOSIS — F33.2 SEVERE RECURRENT MAJOR DEPRESSION WITHOUT PSYCHOTIC FEATURES (HCC): ICD-10-CM

## 2024-12-27 DIAGNOSIS — F41.1 GENERALIZED ANXIETY DISORDER: ICD-10-CM

## 2024-12-27 PROCEDURE — 90853 GROUP PSYCHOTHERAPY: CPT | Performed by: MARRIAGE & FAMILY THERAPIST

## 2024-12-27 NOTE — GROUP NOTE
Group Appointment Information    Date: 12/26/24   Attendance Duration: 60 minutes  Number of Participants: 8 participants  Program / Group: IOP - Intensive Outpatient Program  Topics Covered: Stress Management      Group Therapy Start Time:  2:30 PM    Attendance: Attended  Participation: Active verbal participation    Affect/Mood Range: Normal range  Affect/Mood Display: CWC - Congruent w/Content  Cognition: Alert and Oriented    Evidence of imminent suicide risk: No   Evidence of imminent homicide risk: No     Therapeutic Interventions: Emotion clarification and Supportive psychotherapy  Progress Toward Treatment Goal: Mild improvement; Pt continued to process emotions.

## 2024-12-27 NOTE — GROUP NOTE
Group Appointment Information    Date: 12/27/24   Attendance Duration: 60 minutes  Number of Participants: 7 participants  Program / Group: IOP - Intensive Outpatient Program  Topics Covered: Stress Management      Group Therapy Start Time:  1:30 PM    Attendance: Attended  Participation: Active verbal participation    Affect/Mood Range: Normal range  Affect/Mood Display: CWC - Congruent w/Content  Cognition: Alert and Oriented    Evidence of imminent suicide risk: No   Evidence of imminent homicide risk: No     Therapeutic Interventions: Psychoeducation and Cognitive clarification  Progress Toward Treatment Goal: Mild improvement

## 2024-12-28 NOTE — GROUP NOTE
Group Appointment Information    Date: 12/27/24   Attendance Duration: 60 minutes  Number of Participants: 7 participants  Program / Group: IOP - Intensive Outpatient Program  Topics Covered: Stress Management      Group Therapy Start Time:  2:30 PM    Attendance: Attended  Participation: Attentive    Affect/Mood Range: Normal range  Affect/Mood Display: CWC - Congruent w/Content  Cognition: Alert and Oriented    Evidence of imminent suicide risk: No   Evidence of imminent homicide risk: No     Therapeutic Interventions: Emotion clarification and Supportive psychotherapy  Progress Toward Treatment Goal: Mild improvement; pt appears to be reserved and while they are listening they are not yet directly engaging with others.

## 2024-12-28 NOTE — GROUP NOTE
Group Appointment Information    Date: 12/27/24   Attendance Duration: 60 minutes  Number of Participants: 7 participants  Program / Group: IOP - Intensive Outpatient Program  Topics Covered: Care in Relationships      Group Therapy Start Time:  3:30 PM    Attendance: Attended  Participation: Limited verbal participation    Affect/Mood Range: Constricted  Affect/Mood Display: CWC - Congruent w/Content  Cognition: Drowsy/Somnolent    Evidence of imminent suicide risk: No   Evidence of imminent homicide risk: No     Therapeutic Interventions: Emotion clarification and Supportive psychotherapy  Progress Toward Treatment Goal: Mild improvement; pt continued to process emotions.

## 2025-01-02 ENCOUNTER — HOSPITAL ENCOUNTER (OUTPATIENT)
Dept: BEHAVIORAL HEALTH | Facility: MEDICAL CENTER | Age: 25
End: 2025-01-02
Attending: PSYCHIATRY & NEUROLOGY
Payer: COMMERCIAL

## 2025-01-02 DIAGNOSIS — F33.2 SEVERE RECURRENT MAJOR DEPRESSION WITHOUT PSYCHOTIC FEATURES (HCC): ICD-10-CM

## 2025-01-02 DIAGNOSIS — F32.1 CURRENT MODERATE EPISODE OF MAJOR DEPRESSIVE DISORDER, UNSPECIFIED WHETHER RECURRENT (HCC): ICD-10-CM

## 2025-01-02 DIAGNOSIS — F41.1 GENERALIZED ANXIETY DISORDER: ICD-10-CM

## 2025-01-02 DIAGNOSIS — F41.1 GAD (GENERALIZED ANXIETY DISORDER): ICD-10-CM

## 2025-01-02 PROCEDURE — 90853 GROUP PSYCHOTHERAPY: CPT | Performed by: MARRIAGE & FAMILY THERAPIST

## 2025-01-02 PROCEDURE — 90832 PSYTX W PT 30 MINUTES: CPT | Performed by: MARRIAGE & FAMILY THERAPIST

## 2025-01-02 ASSESSMENT — ANXIETY QUESTIONNAIRES
GAD7 TOTAL SCORE: 5
5. BEING SO RESTLESS THAT IT IS HARD TO SIT STILL: SEVERAL DAYS
7. FEELING AFRAID AS IF SOMETHING AWFUL MIGHT HAPPEN: NOT AT ALL
6. BECOMING EASILY ANNOYED OR IRRITABLE: NOT AT ALL
2. NOT BEING ABLE TO STOP OR CONTROL WORRYING: SEVERAL DAYS
1. FEELING NERVOUS, ANXIOUS, OR ON EDGE: MORE THAN HALF THE DAYS
4. TROUBLE RELAXING: SEVERAL DAYS
3. WORRYING TOO MUCH ABOUT DIFFERENT THINGS: NOT AT ALL

## 2025-01-02 ASSESSMENT — PATIENT HEALTH QUESTIONNAIRE - PHQ9
SUM OF ALL RESPONSES TO PHQ QUESTIONS 1-9: 8
CLINICAL INTERPRETATION OF PHQ2 SCORE: 2
5. POOR APPETITE OR OVEREATING: 0 - NOT AT ALL

## 2025-01-02 NOTE — GROUP NOTE
Group Appointment Information    Date: 01/02/25   Attendance Duration: 60 minutes  Number of Participants: 5 participants  Program / Group: IOP - Intensive Outpatient Program  Topics Covered: Stress Management      Group Therapy Start Time:  1:30 PM    Attendance: Attended  Participation: Active verbal participation    Affect/Mood Range: Normal range  Affect/Mood Display: CWC - Congruent w/Content  Cognition: Alert and Oriented    Evidence of imminent suicide risk: No   Evidence of imminent homicide risk: No     Therapeutic Interventions: Psychoeducation and Cognitive clarification  Progress Toward Treatment Goal: Moderate improvement
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
## 2025-01-02 NOTE — GROUP NOTE
Group Appointment Information    Date: 01/02/25   Attendance Duration: 60 minutes  Number of Participants: 5 participants  Program / Group: IOP - Intensive Outpatient Program  Topics Covered: Mindfulness      Group Therapy Start Time:  2:30 PM    Attendance: Attended  Participation: Active verbal participation    Affect/Mood Range: Normal range  Affect/Mood Display: CWC - Congruent w/Content  Cognition: Alert and Oriented    Evidence of imminent suicide risk: No   Evidence of imminent homicide risk: No     Therapeutic Interventions: Emotion clarification and Supportive psychotherapy  Progress Toward Treatment Goal: Significant improvement; pt shared out and was well supported by fellow group members.

## 2025-01-03 ENCOUNTER — HOSPITAL ENCOUNTER (OUTPATIENT)
Dept: BEHAVIORAL HEALTH | Facility: MEDICAL CENTER | Age: 25
End: 2025-01-03
Attending: PSYCHIATRY & NEUROLOGY
Payer: COMMERCIAL

## 2025-01-03 DIAGNOSIS — F33.2 SEVERE RECURRENT MAJOR DEPRESSION WITHOUT PSYCHOTIC FEATURES (HCC): ICD-10-CM

## 2025-01-03 DIAGNOSIS — F41.1 GENERALIZED ANXIETY DISORDER: ICD-10-CM

## 2025-01-03 PROCEDURE — 90853 GROUP PSYCHOTHERAPY: CPT | Performed by: MARRIAGE & FAMILY THERAPIST

## 2025-01-03 NOTE — GROUP NOTE
Group Appointment Information    Date: 01/03/25   Attendance Duration: 60 minutes  Number of Participants: 5 participants  Program / Group: IOP - Intensive Outpatient Program  Topics Covered: Stress Management      Group Therapy Start Time:  1:30 PM    Attendance: Attended  Participation: Active verbal participation    Affect/Mood Range: Normal range  Affect/Mood Display: CWC - Congruent w/Content  Cognition: Alert and Oriented    Evidence of imminent suicide risk: No   Evidence of imminent homicide risk: No     Therapeutic Interventions: Psychoeducation and Cognitive clarification  Progress Toward Treatment Goal: Mild improvement

## 2025-01-03 NOTE — GROUP NOTE
Group Appointment Information    Date: 01/02/25   Attendance Duration: 60 minutes  Number of Participants: 5 participants  Program / Group: IOP - Intensive Outpatient Program  Topics Covered: Stress Management      Group Therapy Start Time:  3:30 PM    Attendance: Attended  Participation: Active verbal participation    Affect/Mood Range: Normal range  Affect/Mood Display: CWC - Congruent w/Content  Cognition: Alert and Oriented    Evidence of imminent suicide risk: No   Evidence of imminent homicide risk: No     Therapeutic Interventions: Emotion clarification and Supportive psychotherapy  Progress Toward Treatment Goal: Moderate improvement; pt continued to process emotions.

## 2025-01-03 NOTE — GROUP NOTE
Group Appointment Information    Date: 01/03/25   Attendance Duration: 60 minutes  Number of Participants: 5 participants  Program / Group: IOP - Intensive Outpatient Program  Topics Covered: Stress Management      Group Therapy Start Time:  2:30 PM    Attendance: Attended  Participation: Active verbal participation    Affect/Mood Range: Normal range  Affect/Mood Display: CWC - Congruent w/Content  Cognition: Alert and Oriented    Evidence of imminent suicide risk: No   Evidence of imminent homicide risk: No     Therapeutic Interventions: Emotion clarification and Supportive psychotherapy  Progress Toward Treatment Goal: Moderate improvement; pt shared out and was well supported by fellow group members.

## 2025-01-04 NOTE — GROUP NOTE
Group Appointment Information    Date: 01/03/25   Attendance Duration: 60 minutes  Number of Participants: 5 participants  Program / Group: IOP - Intensive Outpatient Program  Topics Covered: Stress Management      Group Therapy Start Time:  3:30 PM    Attendance: Attended  Participation: Active verbal participation    Affect/Mood Range: Normal range  Affect/Mood Display: CWC - Congruent w/Content  Cognition: Alert and Oriented    Evidence of imminent suicide risk: No   Evidence of imminent homicide risk: No     Therapeutic Interventions: Emotion clarification and Supportive psychotherapy  Progress Toward Treatment Goal: Moderate improvement; Pt continued to process emotions.

## 2025-01-07 ENCOUNTER — HOSPITAL ENCOUNTER (OUTPATIENT)
Dept: BEHAVIORAL HEALTH | Facility: MEDICAL CENTER | Age: 25
End: 2025-01-07
Attending: PSYCHIATRY & NEUROLOGY
Payer: COMMERCIAL

## 2025-01-07 DIAGNOSIS — F41.1 GENERALIZED ANXIETY DISORDER: ICD-10-CM

## 2025-01-07 DIAGNOSIS — F33.2 SEVERE RECURRENT MAJOR DEPRESSION WITHOUT PSYCHOTIC FEATURES (HCC): ICD-10-CM

## 2025-01-07 PROCEDURE — 90853 GROUP PSYCHOTHERAPY: CPT | Performed by: MARRIAGE & FAMILY THERAPIST

## 2025-01-07 NOTE — GROUP NOTE
Group Appointment Information    Date: 01/07/25   Attendance Duration: 60 minutes  Number of Participants: 7 participants  Program / Group: IOP - Intensive Outpatient Program  Topics Covered: Anger      Group Therapy Start Time:  1:30 PM    Attendance: Attended  Participation: Active verbal participation    Affect/Mood Range: Normal range  Affect/Mood Display: CWC - Congruent w/Content  Cognition: Alert and Oriented    Evidence of imminent suicide risk: No   Evidence of imminent homicide risk: No     Therapeutic Interventions: Psychoeducation and Cognitive clarification  Progress Toward Treatment Goal: Mild improvement; pt learned skills of emotional regulation for improved mental well-being.

## 2025-01-08 ENCOUNTER — HOSPITAL ENCOUNTER (OUTPATIENT)
Dept: BEHAVIORAL HEALTH | Facility: MEDICAL CENTER | Age: 25
End: 2025-01-08
Attending: PSYCHIATRY & NEUROLOGY
Payer: COMMERCIAL

## 2025-01-08 DIAGNOSIS — F33.2 SEVERE RECURRENT MAJOR DEPRESSION WITHOUT PSYCHOTIC FEATURES (HCC): ICD-10-CM

## 2025-01-08 DIAGNOSIS — F41.1 GENERALIZED ANXIETY DISORDER: ICD-10-CM

## 2025-01-08 PROCEDURE — 90853 GROUP PSYCHOTHERAPY: CPT | Performed by: MARRIAGE & FAMILY THERAPIST

## 2025-01-08 PROCEDURE — 90832 PSYTX W PT 30 MINUTES: CPT | Performed by: MARRIAGE & FAMILY THERAPIST

## 2025-01-08 ASSESSMENT — PATIENT HEALTH QUESTIONNAIRE - PHQ9
CLINICAL INTERPRETATION OF PHQ2 SCORE: 1
5. POOR APPETITE OR OVEREATING: 0 - NOT AT ALL
SUM OF ALL RESPONSES TO PHQ QUESTIONS 1-9: 10

## 2025-01-08 ASSESSMENT — ANXIETY QUESTIONNAIRES
2. NOT BEING ABLE TO STOP OR CONTROL WORRYING: NEARLY EVERY DAY
5. BEING SO RESTLESS THAT IT IS HARD TO SIT STILL: MORE THAN HALF THE DAYS
4. TROUBLE RELAXING: SEVERAL DAYS
1. FEELING NERVOUS, ANXIOUS, OR ON EDGE: SEVERAL DAYS
GAD7 TOTAL SCORE: 12
7. FEELING AFRAID AS IF SOMETHING AWFUL MIGHT HAPPEN: SEVERAL DAYS
6. BECOMING EASILY ANNOYED OR IRRITABLE: SEVERAL DAYS
3. WORRYING TOO MUCH ABOUT DIFFERENT THINGS: NEARLY EVERY DAY

## 2025-01-08 NOTE — GROUP NOTE
Group Appointment Information    Date: 01/07/25   Attendance Duration: 60  minutes  Number of Participants: 7 participants  Program / Group: IOP - Intensive Outpatient Program  Topics Covered: Stress Management      Group Therapy Start Time:  3:30 PM    Attendance: Attended  Participation: Limited verbal participation    Affect/Mood Range: Normal range  Affect/Mood Display: CWC - Congruent w/Content  Cognition: Alert and Oriented    Evidence of imminent suicide risk: No   Evidence of imminent homicide risk: No     Therapeutic Interventions: Emotion clarification and Supportive psychotherapy  Progress Toward Treatment Goal: No change; pt continued to process emotions.

## 2025-01-08 NOTE — GROUP NOTE
Group Appointment Information    Date: 01/07/25   Attendance Duration: 50 minutes  Number of Participants: 7 participants  Program / Group: IOP - Intensive Outpatient Program  Topics Covered: Stress Management      Group Therapy Start Time:  2:30 PM    Attendance: Attended  Participation: Active verbal participation    Affect/Mood Range: Normal range  Affect/Mood Display: CWC - Congruent w/Content  Cognition: Alert and Oriented    Evidence of imminent suicide risk: No   Evidence of imminent homicide risk: No     Therapeutic Interventions: Emotion clarification and Supportive psychotherapy  Progress Toward Treatment Goal: No change; pt continues to be somewhat reserved in what he shares out.
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
## 2025-01-09 ENCOUNTER — HOSPITAL ENCOUNTER (OUTPATIENT)
Dept: BEHAVIORAL HEALTH | Facility: MEDICAL CENTER | Age: 25
End: 2025-01-09
Attending: PSYCHIATRY & NEUROLOGY
Payer: COMMERCIAL

## 2025-01-09 DIAGNOSIS — F33.2 SEVERE RECURRENT MAJOR DEPRESSION WITHOUT PSYCHOTIC FEATURES (HCC): ICD-10-CM

## 2025-01-09 DIAGNOSIS — F41.1 GENERALIZED ANXIETY DISORDER: ICD-10-CM

## 2025-01-09 PROCEDURE — 90853 GROUP PSYCHOTHERAPY: CPT | Performed by: MARRIAGE & FAMILY THERAPIST

## 2025-01-09 NOTE — GROUP NOTE
"Group Appointment Information    Date: 01/08/25   Attendance Duration: 60 minutes  Number of Participants: 8 participants  Program / Group: IOP - Intensive Outpatient Program  Topics Covered: Stress Management      Group Therapy Start Time:  1:30 PM    Attendance: Attended  Participation: Active verbal participation    Affect/Mood Range: Normal range  Affect/Mood Display: CWC - Congruent w/Content  Cognition: Alert and Oriented    Evidence of imminent suicide risk: No   Evidence of imminent homicide risk: No     Therapeutic Interventions: Emotion clarification and Supportive psychotherapy  Progress Toward Treatment Goal: Mild improvement; pt learned what the \"window of tolerance for autonomic activation is and how to regulate emotions within this context.   "

## 2025-01-09 NOTE — GROUP NOTE
Group Appointment Information    Date: 01/09/25   Attendance Duration: 60 minutes  Number of Participants: 8 participants  Program / Group: IOP - Intensive Outpatient Program  Topics Covered: Stress Management      Group Therapy Start Time:  1:30 PM    Attendance: Attended  Participation: Active verbal participation    Affect/Mood Range: Normal range  Affect/Mood Display: CWC - Congruent w/Content  Cognition: Alert and Oriented    Evidence of imminent suicide risk: No   Evidence of imminent homicide risk: No     Therapeutic Interventions: Psychoeducation  Progress Toward Treatment Goal: Mild improvement; pt learned the difference between eustress and distress as it applies to mental and physical well-being.

## 2025-01-09 NOTE — GROUP NOTE
Group Appointment Information    Date: 01/08/25   Attendance Duration: 60 minutes  Number of Participants: 7 participants  Program / Group: IOP - Intensive Outpatient Program  Topics Covered: Stress Management      Group Therapy Start Time:  3:30 PM    Attendance: Attended  Participation: Active verbal participation    Affect/Mood Range: Normal range  Affect/Mood Display: CWC - Congruent w/Content  Cognition: Alert and Oriented    Evidence of imminent suicide risk: No   Evidence of imminent homicide risk: No     Therapeutic Interventions: Emotion clarification and Supportive psychotherapy  Progress Toward Treatment Goal: Moderate improvement

## 2025-01-09 NOTE — ADDENDUM NOTE
Encounter addended by: IVETH Taylor on: 1/8/2025 6:20 PM   Actions taken: Charge Capture section accepted

## 2025-01-09 NOTE — GROUP NOTE
Group Appointment Information    Date: 01/08/25   Attendance Duration: 60 minutes  Number of Participants: 7 participants  Program / Group: IOP - Intensive Outpatient Program  Topics Covered: Stress Management      Group Therapy Start Time:  2:30 PM    Attendance: Attended  Participation: Active verbal participation    Affect/Mood Range: Normal range  Affect/Mood Display: CWC - Congruent w/Content  Cognition: Alert and Oriented    Evidence of imminent suicide risk: No   Evidence of imminent homicide risk: No     Therapeutic Interventions: Emotion clarification and Supportive psychotherapy  Progress Toward Treatment Goal: Moderate improvement; pt shared out and was well supported by fellow group members.

## 2025-01-10 NOTE — GROUP NOTE
Group Appointment Information    Date: 01/09/25   Attendance Duration: 60 minutes  Number of Participants: 8 participants  Program / Group: IOP - Intensive Outpatient Program  Topics Covered: Mindfulness and Other (Comment):      Group Therapy Start Time:  2:30 PM    Attendance: Attended  Participation: Active verbal participation    Affect/Mood Range: Normal range  Affect/Mood Display: CWC - Congruent w/Content  Cognition: Alert and Oriented    Evidence of imminent suicide risk: No   Evidence of imminent homicide risk: No     Therapeutic Interventions: Emotion clarification and Supportive psychotherapy  Progress Toward Treatment Goal: Mild improvement; pt shared out and was well supported by fellow pts.

## 2025-01-10 NOTE — GROUP NOTE
Group Appointment Information    Date: 01/09/25   Attendance Duration: 60 minutes  Number of Participants: 8 participants  Program / Group: IOP - Intensive Outpatient Program  Topics Covered: Care in Relationships      Group Therapy Start Time:  3:30 PM    Attendance: Attended  Participation: Active verbal participation    Affect/Mood Range: Normal range  Affect/Mood Display: CWC - Congruent w/Content  Cognition: Alert and Oriented    Evidence of imminent suicide risk: No   Evidence of imminent homicide risk: No     Therapeutic Interventions: Emotion clarification and Supportive psychotherapy  Progress Toward Treatment Goal: Mild improvement; pt continue to process emotions.

## 2025-01-14 ENCOUNTER — HOSPITAL ENCOUNTER (OUTPATIENT)
Dept: BEHAVIORAL HEALTH | Facility: MEDICAL CENTER | Age: 25
End: 2025-01-14
Attending: PSYCHIATRY & NEUROLOGY
Payer: COMMERCIAL

## 2025-01-14 DIAGNOSIS — F33.2 SEVERE RECURRENT MAJOR DEPRESSION WITHOUT PSYCHOTIC FEATURES (HCC): ICD-10-CM

## 2025-01-14 DIAGNOSIS — F41.1 GENERALIZED ANXIETY DISORDER: ICD-10-CM

## 2025-01-14 PROCEDURE — 90853 GROUP PSYCHOTHERAPY: CPT | Performed by: MARRIAGE & FAMILY THERAPIST

## 2025-01-14 NOTE — GROUP NOTE
Group Appointment Information    Date: 01/14/25   Attendance Duration: 60 minutes  Number of Participants: 7 participants  Program / Group: IOP - Intensive Outpatient Program  Topics Covered: Stress Management      Group Therapy Start Time:  1:30 PM    Attendance: Attended  Participation: Active verbal participation    Affect/Mood Range: Normal range  Affect/Mood Display: CWC - Congruent w/Content  Cognition: Alert and Oriented    Evidence of imminent suicide risk: No   Evidence of imminent homicide risk: No     Therapeutic Interventions: Psychoeducation and Cognitive clarification  Progress Toward Treatment Goal: Moderate improvement; pt learned the role of the autonomic nervous system in regulating emotions and how to influence it to resolve felt depression and anxiety in the moment.

## 2025-01-14 NOTE — GROUP NOTE
Group Appointment Information    Date: 01/14/25   Attendance Duration: 60 minutes  Number of Participants: 7 participants  Program / Group: IOP - Intensive Outpatient Program  Topics Covered: Stress Management      Group Therapy Start Time:  2:30 PM    Attendance: Attended  Participation: Active verbal participation    Affect/Mood Range: Normal range  Affect/Mood Display: CWC - Congruent w/Content  Cognition: Alert and Oriented    Evidence of imminent suicide risk: No   Evidence of imminent homicide risk: No     Therapeutic Interventions: Emotion clarification and Supportive psychotherapy  Progress Toward Treatment Goal: Significant improvement; pt shared out and was well supported by fellow pts.

## 2025-01-15 ENCOUNTER — HOSPITAL ENCOUNTER (OUTPATIENT)
Dept: BEHAVIORAL HEALTH | Facility: MEDICAL CENTER | Age: 25
End: 2025-01-15
Attending: PSYCHIATRY & NEUROLOGY
Payer: COMMERCIAL

## 2025-01-15 DIAGNOSIS — F90.2 ATTENTION DEFICIT HYPERACTIVITY DISORDER (ADHD), COMBINED TYPE: ICD-10-CM

## 2025-01-15 DIAGNOSIS — F32.1 CURRENT MODERATE EPISODE OF MAJOR DEPRESSIVE DISORDER WITHOUT PRIOR EPISODE (HCC): ICD-10-CM

## 2025-01-15 DIAGNOSIS — F43.10 PTSD (POST-TRAUMATIC STRESS DISORDER): ICD-10-CM

## 2025-01-15 DIAGNOSIS — F33.2 SEVERE RECURRENT MAJOR DEPRESSION WITHOUT PSYCHOTIC FEATURES (HCC): ICD-10-CM

## 2025-01-15 DIAGNOSIS — F41.1 GENERALIZED ANXIETY DISORDER: ICD-10-CM

## 2025-01-15 PROCEDURE — 99214 OFFICE O/P EST MOD 30 MIN: CPT | Performed by: PSYCHIATRY & NEUROLOGY

## 2025-01-15 PROCEDURE — 90853 GROUP PSYCHOTHERAPY: CPT | Performed by: MARRIAGE & FAMILY THERAPIST

## 2025-01-15 NOTE — GROUP NOTE
Group Appointment Information    Date: 01/14/25   Attendance Duration: 60 minutes  Number of Participants: 6 participants  Program / Group: IOP - Intensive Outpatient Program  Topics Covered: Depression Recovery      Group Therapy Start Time:  3:30 PM    Attendance: Attended  Participation: Active verbal participation    Affect/Mood Range: Normal range  Affect/Mood Display: CWC - Congruent w/Content  Cognition: Alert and Oriented    Evidence of imminent suicide risk: No   Evidence of imminent homicide risk: No     Therapeutic Interventions: Emotion clarification and Supportive psychotherapy  Progress Toward Treatment Goal: Moderate improvement; pt continued to process emotions and appeared more confident.
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
## 2025-01-16 ENCOUNTER — APPOINTMENT (OUTPATIENT)
Dept: BEHAVIORAL HEALTH | Facility: MEDICAL CENTER | Age: 25
End: 2025-01-16
Attending: PSYCHIATRY & NEUROLOGY
Payer: COMMERCIAL

## 2025-01-16 NOTE — GROUP NOTE
Group Appointment Information    Date: 01/15/25   Attendance Duration: 60 minutes  Number of Participants: 8 participants  Program / Group: IOP - Intensive Outpatient Program  Topics Covered: Stress Management      Group Therapy Start Time:  3:30 PM    Attendance: Attended  Participation: Active verbal participation    Affect/Mood Range: Normal range  Affect/Mood Display: CWC - Congruent w/Content  Cognition: Alert and Oriented    Evidence of imminent suicide risk: No   Evidence of imminent homicide risk: No     Therapeutic Interventions: Emotion clarification and Supportive psychotherapy  Progress Toward Treatment Goal: Mild improvement; pt continued to engage and process emotions.

## 2025-01-16 NOTE — GROUP NOTE
Group Appointment Information    Date: 01/15/25   Attendance Duration: 60 minutes  Number of Participants: 9 participants  Program / Group: IOP - Intensive Outpatient Program  Topics Covered: Regulating emotions      Group Therapy Start Time:  2:30 PM    Attendance: Attended  Participation: Active verbal participation    Affect/Mood Range: Normal range  Affect/Mood Display: CWC - Congruent w/Content  Cognition: Alert and Oriented    Evidence of imminent suicide risk: No   Evidence of imminent homicide risk: No     Therapeutic Interventions: Emotion clarification and Supportive psychotherapy  Progress Toward Treatment Goal: Moderate improvement; pt shared out and was well supported by fellow group members.

## 2025-01-21 ENCOUNTER — HOSPITAL ENCOUNTER (OUTPATIENT)
Dept: BEHAVIORAL HEALTH | Facility: MEDICAL CENTER | Age: 25
End: 2025-01-21
Attending: PSYCHIATRY & NEUROLOGY
Payer: COMMERCIAL

## 2025-01-22 ENCOUNTER — HOSPITAL ENCOUNTER (OUTPATIENT)
Dept: BEHAVIORAL HEALTH | Facility: MEDICAL CENTER | Age: 25
End: 2025-01-22
Attending: PSYCHIATRY & NEUROLOGY
Payer: COMMERCIAL

## 2025-01-22 DIAGNOSIS — F41.1 GENERALIZED ANXIETY DISORDER: ICD-10-CM

## 2025-01-22 DIAGNOSIS — F33.2 SEVERE RECURRENT MAJOR DEPRESSION WITHOUT PSYCHOTIC FEATURES (HCC): ICD-10-CM

## 2025-01-22 PROCEDURE — 90853 GROUP PSYCHOTHERAPY: CPT | Performed by: MARRIAGE & FAMILY THERAPIST

## 2025-01-22 PROCEDURE — 90832 PSYTX W PT 30 MINUTES: CPT | Performed by: MARRIAGE & FAMILY THERAPIST

## 2025-01-22 ASSESSMENT — ANXIETY QUESTIONNAIRES
4. TROUBLE RELAXING: SEVERAL DAYS
1. FEELING NERVOUS, ANXIOUS, OR ON EDGE: SEVERAL DAYS
GAD7 TOTAL SCORE: 5
3. WORRYING TOO MUCH ABOUT DIFFERENT THINGS: NOT AT ALL
7. FEELING AFRAID AS IF SOMETHING AWFUL MIGHT HAPPEN: NOT AT ALL
2. NOT BEING ABLE TO STOP OR CONTROL WORRYING: NOT AT ALL
6. BECOMING EASILY ANNOYED OR IRRITABLE: MORE THAN HALF THE DAYS
5. BEING SO RESTLESS THAT IT IS HARD TO SIT STILL: SEVERAL DAYS

## 2025-01-22 ASSESSMENT — PATIENT HEALTH QUESTIONNAIRE - PHQ9
SUM OF ALL RESPONSES TO PHQ QUESTIONS 1-9: 5
CLINICAL INTERPRETATION OF PHQ2 SCORE: 1
5. POOR APPETITE OR OVEREATING: 0 - NOT AT ALL

## 2025-01-22 NOTE — GROUP NOTE
Group Appointment Information    Date: 01/22/25   Attendance Duration: 60 minutes  Number of Participants: 9 participants  Program / Group: IOP - Intensive Outpatient Program  Topics Covered: [place] kindness      Group Therapy Start Time:  1:30 PM    Attendance: Attended  Participation: Active verbal participation    Affect/Mood Range: Normal range  Affect/Mood Display: CWC - Congruent w/Content  Cognition: Alert and Oriented    Evidence of imminent suicide risk: No   Evidence of imminent homicide risk: No     Therapeutic Interventions: Psychoeducation and Cognitive clarification  Progress Toward Treatment Goal: Mild improvement

## 2025-01-22 NOTE — PROGRESS NOTES
" [facility]  Therapy Progress Note    Patient Name: Chris Larios  Patient MRN: [id number]  Today's Date: 1/22/2025     Type of session:Individual psychotherapy  Length of session: 60 minutes  Persons in attendance:Patient    Subjective/New Info: Pt reports \"I am going to work on bottom surgery and getting my own therapist.\" Pt reports he is not seeing his mother as often and is considering cutting her out of her life. Pt does have a good relationship with her grandmother.        1/2/2025    12:12 PM 1/8/2025    12:16 PM 1/22/2025    12:01 PM   Depression Screen (PHQ-2/PHQ-9)   PHQ-2 Total Score 2 1 1   PHQ-9 Total Score 8 10 5       Interpretation of PHQ-9 Total Score   Score Severity   1-4 No Depression   5-9 Mild Depression   10-14 Moderate Depression   15-19 Moderately Severe Depression   20-27 Severe Depression         1/2/2025    12:32 PM 1/8/2025    12:27 PM 1/22/2025     1:07 PM   LEE 7   LEE-7 Total Score 5 12 5       Interpretation of LEE 7 Total Score   Score Severity:  0-4 No Anxiety   5-9 Mild Anxiety  10-14 Moderate Anxiety  15-21 Severe Anxiety     Objective/Observations:   Participation: Active verbal participation   Grooming: Casual   Cognition: Alert and Fully Oriented   Eye contact: Good   Mood: Euthymic   Affect: Full range   Thought process: Logical   Speech: Rate within normal limits and Volume within normal limits   Other:     Diagnoses: No diagnosis found.     Current risk:   SUICIDE: Low   Homicide: Low   Self-harm: Low   Relapse: Low   Other:    Safety Plan reviewed? No   If evidence of imminent risk is present, intervention/plan:     Therapeutic Intervention(s): Stressors assessed and Supportive psychotherapy    Treatment Goal(s)/Objective(s) addressed: pt is practicing skills of Acceptance and self compassion.      Progress toward Treatment Goals: Mild improvement    Plan:  - Continue Intensive Outpatient Program    Chris Mcclellan, " IVETH  1/22/2025

## 2025-01-23 ENCOUNTER — TELEMEDICINE (OUTPATIENT)
Dept: BEHAVIORAL HEALTH | Facility: CLINIC | Age: 25
End: 2025-01-23
Payer: COMMERCIAL

## 2025-01-23 ENCOUNTER — HOSPITAL ENCOUNTER (OUTPATIENT)
Dept: BEHAVIORAL HEALTH | Facility: MEDICAL CENTER | Age: 25
End: 2025-01-23
Attending: PSYCHIATRY & NEUROLOGY
Payer: COMMERCIAL

## 2025-01-23 DIAGNOSIS — F43.10 PTSD (POST-TRAUMATIC STRESS DISORDER): ICD-10-CM

## 2025-01-23 DIAGNOSIS — F33.2 SEVERE RECURRENT MAJOR DEPRESSION WITHOUT PSYCHOTIC FEATURES (HCC): ICD-10-CM

## 2025-01-23 DIAGNOSIS — F41.1 GENERALIZED ANXIETY DISORDER: ICD-10-CM

## 2025-01-23 DIAGNOSIS — F32.1 CURRENT MODERATE EPISODE OF MAJOR DEPRESSIVE DISORDER WITHOUT PRIOR EPISODE (HCC): ICD-10-CM

## 2025-01-23 PROBLEM — F43.0 ACUTE STRESS DISORDER: Status: RESOLVED | Noted: 2024-12-11 | Resolved: 2025-01-23

## 2025-01-23 PROCEDURE — 90853 GROUP PSYCHOTHERAPY: CPT | Performed by: MARRIAGE & FAMILY THERAPIST

## 2025-01-23 PROCEDURE — 99214 OFFICE O/P EST MOD 30 MIN: CPT | Mod: 95 | Performed by: PSYCHIATRY & NEUROLOGY

## 2025-01-23 PROCEDURE — 90833 PSYTX W PT W E/M 30 MIN: CPT | Mod: 95 | Performed by: PSYCHIATRY & NEUROLOGY

## 2025-01-23 RX ORDER — FLUOXETINE 40 MG/1
40 CAPSULE ORAL DAILY
Qty: 90 CAPSULE | Refills: 1 | Status: SHIPPED | OUTPATIENT
Start: 2025-01-23

## 2025-01-23 RX ORDER — FLUOXETINE 20 MG/1
20 TABLET, FILM COATED ORAL DAILY
Qty: 90 TABLET | Refills: 1 | Status: SHIPPED | OUTPATIENT
Start: 2025-01-23

## 2025-01-23 NOTE — GROUP NOTE
Group Appointment Information    Date: 01/22/25   Attendance Duration: 60 minutes  Number of Participants: 8 participants  Program / Group: IOP - Intensive Outpatient Program  Topics Covered: Stress Management      Group Therapy Start Time:  3:30 PM    Attendance: Attended  Participation: Active verbal participation    Affect/Mood Range: Normal range  Affect/Mood Display: CWC - Congruent w/Content  Cognition: Alert and Oriented    Evidence of imminent suicide risk: No   Evidence of imminent homicide risk: No     Therapeutic Interventions: Emotion clarification and Supportive psychotherapy  Progress Toward Treatment Goal: Mild improvement; pt continued to process emotions.

## 2025-01-23 NOTE — GROUP NOTE
Group Appointment Information    Date: 01/23/25   Attendance Duration: 60 minutes  Number of Participants: 10 participants  Program / Group: IOP - Intensive Outpatient Program  Topics Covered: Cognitive distortions      Group Therapy Start Time:  1:30 PM    Attendance: Attended  Participation: Active verbal participation    Affect/Mood Range: Normal range  Affect/Mood Display: CWC - Congruent w/Content  Cognition: Alert and Oriented    Evidence of imminent suicide risk: No   Evidence of imminent homicide risk: No     Therapeutic Interventions: Emotion clarification and Supportive psychotherapy  Progress Toward Treatment Goal: Significant improvement; pt learned CBT skills of challenging negative cognitions and identifying cognitive distortions.
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
## 2025-01-23 NOTE — GROUP NOTE
Group Appointment Information    Date: 01/22/25   Attendance Duration: 60 minutes  Number of Participants: 10 participants  Program / Group: IOP - Intensive Outpatient Program  Topics Covered: Care in Relationships      Group Therapy Start Time:  2:30 PM    Attendance: Attended  Participation: Active verbal participation    Affect/Mood Range: Normal range  Affect/Mood Display: CWC - Congruent w/Content  Cognition: Alert and Oriented    Evidence of imminent suicide risk: No   Evidence of imminent homicide risk: No     Therapeutic Interventions: Emotion clarification and Supportive psychotherapy  Progress Toward Treatment Goal: Mild improvement; pt shared out and was well supported by fellow pts.

## 2025-01-24 NOTE — GROUP NOTE
Group Appointment Information    Date: 01/23/25   Attendance Duration: 60 minutes  Number of Participants: 10 participants  Program / Group: IOP - Intensive Outpatient Program  Topics Covered: Stress Management      Group Therapy Start Time:  2:30 PM    Attendance: Attended  Participation: Active verbal participation    Affect/Mood Range: Normal range  Affect/Mood Display: CWC - Congruent w/Content  Cognition: Alert and Oriented    Evidence of imminent suicide risk: No   Evidence of imminent homicide risk: No     Therapeutic Interventions: Emotion clarification and Supportive psychotherapy  Progress Toward Treatment Goal: Moderate improvement; pt shared out and was well supported by fellow participants.

## 2025-01-24 NOTE — ADDENDUM NOTE
Encounter addended by: Mayi Guillen M.D. on: 1/24/2025 11:34 AM   Actions taken: Clinical Note Signed

## 2025-01-24 NOTE — GROUP NOTE
Group Appointment Information    Date: 01/23/25   Attendance Duration: 60 minutes  Number of Participants: 10 participants  Program / Group: IOP - Intensive Outpatient Program  Topics Covered: Mindfulness      Group Therapy Start Time:  3:30 PM    Attendance: Attended  Participation: Active verbal participation    Affect/Mood Range: Normal range  Affect/Mood Display: CWC - Congruent w/Content  Cognition: Alert and Oriented    Evidence of imminent suicide risk: No   Evidence of imminent homicide risk: No     Therapeutic Interventions: Emotion clarification and Supportive psychotherapy  Progress Toward Treatment Goal: Mild improvement;pt shared out and was well supported by fellow participants.

## 2025-01-28 ENCOUNTER — HOSPITAL ENCOUNTER (OUTPATIENT)
Dept: BEHAVIORAL HEALTH | Facility: MEDICAL CENTER | Age: 25
End: 2025-01-28
Attending: PSYCHIATRY & NEUROLOGY
Payer: COMMERCIAL

## 2025-01-28 DIAGNOSIS — F41.1 GENERALIZED ANXIETY DISORDER: ICD-10-CM

## 2025-01-28 DIAGNOSIS — F33.2 SEVERE RECURRENT MAJOR DEPRESSION WITHOUT PSYCHOTIC FEATURES (HCC): ICD-10-CM

## 2025-01-28 PROCEDURE — 90853 GROUP PSYCHOTHERAPY: CPT | Performed by: MARRIAGE & FAMILY THERAPIST

## 2025-01-28 NOTE — GROUP NOTE
Group Appointment Information    Date: 01/28/25   Attendance Duration: 60 minutes  Number of Participants: 12 participants  Program / Group: IOP - Intensive Outpatient Program  Topics Covered: Mindfulness      Group Therapy Start Time:  1:30 PM    Attendance: Attended  Participation: Active verbal participation    Affect/Mood Range: Normal range  Affect/Mood Display: CWC - Congruent w/Content  Cognition: Alert and Oriented    Evidence of imminent suicide risk: No   Evidence of imminent homicide risk: No     Therapeutic Interventions: Psychoeducation and Cognitive clarification  Progress Toward Treatment Goal: Moderate improvement; pts learned skills from DBT to help with mindful practice and to tolerate frustration.

## 2025-01-29 ENCOUNTER — HOSPITAL ENCOUNTER (OUTPATIENT)
Dept: BEHAVIORAL HEALTH | Facility: MEDICAL CENTER | Age: 25
End: 2025-01-29
Attending: PSYCHIATRY & NEUROLOGY
Payer: COMMERCIAL

## 2025-01-29 DIAGNOSIS — F41.1 GENERALIZED ANXIETY DISORDER: ICD-10-CM

## 2025-01-29 DIAGNOSIS — F33.2 SEVERE RECURRENT MAJOR DEPRESSION WITHOUT PSYCHOTIC FEATURES (HCC): ICD-10-CM

## 2025-01-29 PROCEDURE — 90853 GROUP PSYCHOTHERAPY: CPT | Performed by: MARRIAGE & FAMILY THERAPIST

## 2025-01-29 NOTE — GROUP NOTE
Group Appointment Information    Date: 01/28/25   Attendance Duration: 60 minutes  Number of Participants: 12 participants  Program / Group: IOP - Intensive Outpatient Program  Topics Covered: Stress Management      Group Therapy Start Time:  2:30 PM    Attendance: Attended  Participation: Active verbal participation    Affect/Mood Range: Normal range  Affect/Mood Display: CWC - Congruent w/Content  Cognition: Alert and Oriented    Evidence of imminent suicide risk: No   Evidence of imminent homicide risk: No     Therapeutic Interventions: Emotion clarification and Supportive psychotherapy  Progress Toward Treatment Goal: Mild improvement; pt shared out and was well supported by fellow group members.

## 2025-01-29 NOTE — GROUP NOTE
Group Appointment Information    Date: 01/29/25   Attendance Duration: 60 minutes  Number of Participants: 12 participants  Program / Group: IOP - Intensive Outpatient Program  Topics Covered: Boundaries      Group Therapy Start Time:  1:30 PM    Attendance: Attended  Participation: Active verbal participation    Affect/Mood Range: Normal range  Affect/Mood Display: CWC - Congruent w/Content  Cognition: Alert and Oriented    Evidence of imminent suicide risk: No   Evidence of imminent homicide risk: No     Therapeutic Interventions: Emotion clarification and Supportive psychotherapy  Progress Toward Treatment Goal: Moderate improvement; pt was introduced to the importance of setting boundaries and how to communicate boundaries using assertive language.

## 2025-01-29 NOTE — GROUP NOTE
Group Appointment Information    Date: 01/28/25   Attendance Duration: 60 minutes  Number of Participants: 12 participants  Program / Group: IOP - Intensive Outpatient Program  Topics Covered: Stress Management      Group Therapy Start Time:  3:30 PM    Attendance: Attended  Participation: Active verbal participation    Affect/Mood Range: Normal range  Affect/Mood Display: CWC - Congruent w/Content  Cognition: Alert and Oriented    Evidence of imminent suicide risk: No   Evidence of imminent homicide risk: No     Therapeutic Interventions: Emotion clarification and Supportive psychotherapy  Progress Toward Treatment Goal: Mild improvement

## 2025-01-30 ENCOUNTER — HOSPITAL ENCOUNTER (OUTPATIENT)
Dept: BEHAVIORAL HEALTH | Facility: MEDICAL CENTER | Age: 25
End: 2025-01-30
Attending: PSYCHIATRY & NEUROLOGY
Payer: COMMERCIAL

## 2025-01-30 DIAGNOSIS — F43.10 PTSD (POST-TRAUMATIC STRESS DISORDER): ICD-10-CM

## 2025-01-30 DIAGNOSIS — F41.1 GENERALIZED ANXIETY DISORDER: ICD-10-CM

## 2025-01-30 DIAGNOSIS — F33.2 SEVERE RECURRENT MAJOR DEPRESSION WITHOUT PSYCHOTIC FEATURES (HCC): ICD-10-CM

## 2025-01-30 PROCEDURE — 90853 GROUP PSYCHOTHERAPY: CPT | Performed by: MARRIAGE & FAMILY THERAPIST

## 2025-01-30 PROCEDURE — 90832 PSYTX W PT 30 MINUTES: CPT | Performed by: MARRIAGE & FAMILY THERAPIST

## 2025-01-30 NOTE — GROUP NOTE
Group Appointment Information    Date: 01/29/25   Attendance Duration: 60 minutes  Number of Participants: 12 participants  Program / Group: IOP - Intensive Outpatient Program  Topics Covered: Regulating emotions      Group Therapy Start Time:  2:30 PM    Attendance: Attended  Participation: Active verbal participation    Affect/Mood Range: Normal range  Affect/Mood Display: CWC - Congruent w/Content  Cognition: Alert and Oriented    Evidence of imminent suicide risk: No   Evidence of imminent homicide risk: No     Therapeutic Interventions: Emotion clarification and Supportive psychotherapy  Progress Toward Treatment Goal: Moderate improvement

## 2025-01-30 NOTE — PROGRESS NOTES
" [facility]  Therapy Progress Note    Patient Name: Chris Larios  Patient MRN: [id number]  Today's Date: 1/30/2025     Type of session:Individual psychotherapy  Length of session: 30 minutes  Persons in attendance:Patient    Subjective/New Info: Pt reports her mother \"wants to see pt this weekend.\"  Pt reported she \"wants her mother to get help for her mental wellness.\" Pt said she \"kinds of wants closure with her mother.  I don't care what happens.\"  Pt clarified; mother has schizophrenia and bipolar disorder.  Pt said it is uncontrolled.  \"Referencing her mother- \"sometimes she takes her anger out  on pt's grandmother, and the family pets.\"    Pt reports to feel that she needs to work on her confidence. Pt will talk about what is bothering her to her girlfriend. Pt reports she also has a friend at work to talk to. Pt reports to continue to experience anxiety; sometimes I have it in the back of my head that someone is trying to hurt me.\"      Objective/Observations:   Participation: Active verbal participation   Grooming: Casual   Cognition: Alert and Fully Oriented   Eye contact: Good   Mood: Euthymic   Affect: Full range   Thought process: Logical and Goal-directed   Speech: Rate within normal limits and Volume within normal limits   Other:     Diagnoses:   1. PTSD (post-traumatic stress disorder)         Current risk:   SUICIDE: Low   Homicide: Low   Self-harm: Low   Relapse: Low   Other:    Safety Plan reviewed? No   If evidence of imminent risk is present, intervention/plan:     Therapeutic Intervention(s): Interpersonal effectiveness skills and Stressors assessed    Treatment Goal(s)/Objective(s) addressed: Pt will continue to practice skills of willingness an acceptance and continue to practice self compassion (self compassion.org).     Progress toward Treatment Goals: Significant improvement    Plan:  - Transition toward termination; pt will be continuing at the out patient level of care " to continue to work on increased self liking, confidence and practice regulating his autonomic activation.    BENEDICT Taylor.  1/30/2025

## 2025-01-30 NOTE — GROUP NOTE
Group Appointment Information    Date: 01/29/25   Attendance Duration: 60 minutes  Number of Participants: 11 participants  Program / Group: IOP - Intensive Outpatient Program  Topics Covered: Care in Relationships      Group Therapy Start Time:  3:30 PM    Attendance: Attended  Participation: Active verbal participation    Affect/Mood Range: Normal range  Affect/Mood Display: CWC - Congruent w/Content  Cognition: Alert and Oriented    Evidence of imminent suicide risk: No   Evidence of imminent homicide risk: No     Therapeutic Interventions: Emotion clarification and Supportive psychotherapy  Progress Toward Treatment Goal: Moderate improvement; pt continued to process emotions.

## 2025-01-30 NOTE — GROUP NOTE
Group Appointment Information    Date: 01/30/25   Attendance Duration: 60 minutes  Number of Participants: 12 participants  Program / Group: IOP - Intensive Outpatient Program  Topics Covered: Stress Management      Group Therapy Start Time:  1:30 PM    Attendance: Attended  Participation: Active verbal participation    Affect/Mood Range: Normal range  Affect/Mood Display: CWC - Congruent w/Content  Cognition: Alert and Oriented    Evidence of imminent suicide risk: No   Evidence of imminent homicide risk: No     Therapeutic Interventions: Psychoeducation and Cognitive clarification  Progress Toward Treatment Goal: Moderate improvement; pt learned how to use the knowledge of the autonomic nervous system to self regulate and heal from traumatic experiences.

## 2025-01-31 NOTE — GROUP NOTE
Group Appointment Information    Date: 01/30/25   Attendance Duration: 60 minutes  Number of Participants: 13 participants  Program / Group: IOP - Intensive Outpatient Program  Topics Covered: Regulating emotions      Group Therapy Start Time:  3:30 PM    Attendance: Attended  Participation: Active verbal participation    Affect/Mood Range: Normal range  Affect/Mood Display: CWC - Congruent w/Content  Cognition: Oriented    Evidence of imminent suicide risk: No   Evidence of imminent homicide risk: No     Therapeutic Interventions: Emotion clarification and Supportive psychotherapy  Progress Toward Treatment Goal: Mild improvement; pt continued to process emotions.

## 2025-01-31 NOTE — GROUP NOTE
Group Appointment Information    Date: 01/30/25   Attendance Duration: 60 minutes  Number of Participants: 13 participants  Program / Group: IOP - Intensive Outpatient Program  Topics Covered: Regulating emotions      Group Therapy Start Time:  2:30 PM    Attendance: Attended  Participation: Active verbal participation    Affect/Mood Range: Normal range  Affect/Mood Display: CWC - Congruent w/Content  Cognition: Alert and Oriented    Evidence of imminent suicide risk: No   Evidence of imminent homicide risk: No     Therapeutic Interventions: Emotion clarification and Supportive psychotherapy  Progress Toward Treatment Goal: Mild improvement; pt shared out and was well supported

## 2025-02-03 ENCOUNTER — APPOINTMENT (OUTPATIENT)
Dept: MEDICAL GROUP | Facility: LAB | Age: 25
End: 2025-02-03
Payer: COMMERCIAL

## 2025-02-07 ENCOUNTER — TELEPHONE (OUTPATIENT)
Dept: BEHAVIORAL HEALTH | Facility: MEDICAL CENTER | Age: 25
End: 2025-02-07
Payer: COMMERCIAL

## 2025-02-07 DIAGNOSIS — F32.1 CURRENT MODERATE EPISODE OF MAJOR DEPRESSIVE DISORDER, UNSPECIFIED WHETHER RECURRENT (HCC): ICD-10-CM

## 2025-02-07 NOTE — TELEPHONE ENCOUNTER
[facility]  TRANSFER/DISCHARGE SUMMARY FORM    HHPI / SCP: [facility]  Other Ins.:      Patient Name: Chris Larios  Admission Date: 24  Level of Care Attended:  Intens.OP : 2000  Transfer/Discharge Date: MRN: [id number]  25       SIGNIFICANT FINDINGS/CLINICAL IMPRESSION:   DSM Codes:   IOP    ICD10 Codes:   1. Current moderate episode of major depressive disorder, unspecified whether recurrent (HCC)        Additional problems identified via assessment: none    Treatment Components in Which Patient Participated (check all that apply):  Education group(s), 1:1 teaching/therapy, Medication Management, and Group Therapy    Summary of Course of Treatment: Pt attended with fidelity and appeared to feel less anxious and definitely developed ability to be more self-assertive and communicate felt needs.     Condition at Time of Transfer/Discharge:  Pt  evidenced reduction in felt depression and anxiety per PHQ-9 and LEE-7 measurement instruments.      Referred to:  community provide OP      Patient is in agreement with discharge plan: yes    BENEDICT Taylor.

## 2025-02-09 DIAGNOSIS — Z78.9 MALE-TO-FEMALE TRANSGENDER PERSON: ICD-10-CM

## 2025-02-10 RX ORDER — ESTRADIOL 2 MG/1
6 TABLET ORAL DAILY
Qty: 90 TABLET | Refills: 0 | Status: SHIPPED | OUTPATIENT
Start: 2025-02-10

## 2025-03-26 ENCOUNTER — TELEMEDICINE (OUTPATIENT)
Dept: BEHAVIORAL HEALTH | Facility: CLINIC | Age: 25
End: 2025-03-26
Payer: COMMERCIAL

## 2025-03-26 DIAGNOSIS — F32.1 CURRENT MODERATE EPISODE OF MAJOR DEPRESSIVE DISORDER WITHOUT PRIOR EPISODE (HCC): ICD-10-CM

## 2025-03-26 DIAGNOSIS — F90.2 ATTENTION DEFICIT HYPERACTIVITY DISORDER (ADHD), COMBINED TYPE: ICD-10-CM

## 2025-03-26 PROCEDURE — 90833 PSYTX W PT W E/M 30 MIN: CPT | Mod: 95 | Performed by: PSYCHIATRY & NEUROLOGY

## 2025-03-26 PROCEDURE — 99214 OFFICE O/P EST MOD 30 MIN: CPT | Mod: 95 | Performed by: PSYCHIATRY & NEUROLOGY

## 2025-03-26 RX ORDER — BUPROPION HYDROCHLORIDE 300 MG/1
TABLET ORAL
Qty: 90 TABLET | Refills: 1 | Status: SHIPPED | OUTPATIENT
Start: 2025-03-26

## 2025-03-26 RX ORDER — FLUOXETINE HYDROCHLORIDE 40 MG/1
40 CAPSULE ORAL DAILY
Qty: 90 CAPSULE | Refills: 1 | Status: SHIPPED | OUTPATIENT
Start: 2025-03-26

## 2025-03-26 RX ORDER — FLUOXETINE 20 MG/1
20 TABLET, FILM COATED ORAL DAILY
Qty: 90 TABLET | Refills: 1 | Status: SHIPPED | OUTPATIENT
Start: 2025-03-26
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
## 2025-04-03 DIAGNOSIS — Z78.9 MALE-TO-FEMALE TRANSGENDER PERSON: ICD-10-CM

## 2025-04-07 RX ORDER — SPIRONOLACTONE 100 MG/1
100 TABLET, FILM COATED ORAL 2 TIMES DAILY
Qty: 180 TABLET | Refills: 0 | Status: SHIPPED | OUTPATIENT
Start: 2025-04-07 | End: 2025-04-14 | Stop reason: SDUPTHER

## 2025-04-07 RX ORDER — ESTRADIOL 2 MG/1
6 TABLET ORAL DAILY
Qty: 90 TABLET | Refills: 0 | Status: SHIPPED | OUTPATIENT
Start: 2025-04-07 | End: 2025-04-14 | Stop reason: SDUPTHER

## 2025-04-07 RX ORDER — PROGESTERONE 100 MG/1
100 CAPSULE ORAL DAILY
Qty: 90 CAPSULE | Refills: 0 | Status: SHIPPED | OUTPATIENT
Start: 2025-04-07 | End: 2025-04-14 | Stop reason: SDUPTHER

## 2025-04-11 ENCOUNTER — HOSPITAL ENCOUNTER (EMERGENCY)
Facility: MEDICAL CENTER | Age: 25
End: 2025-04-11
Attending: EMERGENCY MEDICINE
Payer: COMMERCIAL

## 2025-04-11 VITALS
OXYGEN SATURATION: 97 % | HEART RATE: 83 BPM | BODY MASS INDEX: 21.89 KG/M2 | TEMPERATURE: 98.8 F | RESPIRATION RATE: 16 BRPM | SYSTOLIC BLOOD PRESSURE: 122 MMHG | DIASTOLIC BLOOD PRESSURE: 74 MMHG | WEIGHT: 144.4 LBS | HEIGHT: 68 IN

## 2025-04-11 DIAGNOSIS — R42 LIGHTHEADEDNESS: ICD-10-CM

## 2025-04-11 DIAGNOSIS — R55 NEAR SYNCOPE: ICD-10-CM

## 2025-04-11 LAB
ALBUMIN SERPL BCP-MCNC: 4.3 G/DL (ref 3.2–4.9)
ALBUMIN/GLOB SERPL: 1.5 G/DL
ALP SERPL-CCNC: 63 U/L (ref 30–99)
ALT SERPL-CCNC: 9 U/L (ref 2–50)
ANION GAP SERPL CALC-SCNC: 12 MMOL/L (ref 7–16)
AST SERPL-CCNC: 20 U/L (ref 12–45)
BASOPHILS # BLD AUTO: 0.4 % (ref 0–1.8)
BASOPHILS # BLD: 0.03 K/UL (ref 0–0.12)
BILIRUB SERPL-MCNC: <0.2 MG/DL (ref 0.1–1.5)
BUN SERPL-MCNC: 11 MG/DL (ref 8–22)
CALCIUM ALBUM COR SERPL-MCNC: 8.9 MG/DL (ref 8.5–10.5)
CALCIUM SERPL-MCNC: 9.1 MG/DL (ref 8.5–10.5)
CHLORIDE SERPL-SCNC: 104 MMOL/L (ref 96–112)
CO2 SERPL-SCNC: 22 MMOL/L (ref 20–33)
CREAT SERPL-MCNC: 0.91 MG/DL (ref 0.5–1.4)
EKG IMPRESSION: NORMAL
EOSINOPHIL # BLD AUTO: 0.03 K/UL (ref 0–0.51)
EOSINOPHIL NFR BLD: 0.4 % (ref 0–6.9)
ERYTHROCYTE [DISTWIDTH] IN BLOOD BY AUTOMATED COUNT: 41 FL (ref 35.9–50)
GFR SERPLBLD CREATININE-BSD FMLA CKD-EPI: 120 ML/MIN/1.73 M 2
GLOBULIN SER CALC-MCNC: 2.9 G/DL (ref 1.9–3.5)
GLUCOSE SERPL-MCNC: 96 MG/DL (ref 65–99)
HCT VFR BLD AUTO: 41.3 % (ref 42–52)
HGB BLD-MCNC: 13.4 G/DL (ref 14–18)
IMM GRANULOCYTES # BLD AUTO: 0.04 K/UL (ref 0–0.11)
IMM GRANULOCYTES NFR BLD AUTO: 0.6 % (ref 0–0.9)
LYMPHOCYTES # BLD AUTO: 2.06 K/UL (ref 1–4.8)
LYMPHOCYTES NFR BLD: 28.7 % (ref 22–41)
MCH RBC QN AUTO: 29.6 PG (ref 27–33)
MCHC RBC AUTO-ENTMCNC: 32.4 G/DL (ref 32.3–36.5)
MCV RBC AUTO: 91.2 FL (ref 81.4–97.8)
MONOCYTES # BLD AUTO: 0.54 K/UL (ref 0–0.85)
MONOCYTES NFR BLD AUTO: 7.5 % (ref 0–13.4)
NEUTROPHILS # BLD AUTO: 4.47 K/UL (ref 1.82–7.42)
NEUTROPHILS NFR BLD: 62.4 % (ref 44–72)
NRBC # BLD AUTO: 0 K/UL
NRBC BLD-RTO: 0 /100 WBC (ref 0–0.2)
PLATELET # BLD AUTO: 332 K/UL (ref 164–446)
PMV BLD AUTO: 9.5 FL (ref 9–12.9)
POTASSIUM SERPL-SCNC: 3.7 MMOL/L (ref 3.6–5.5)
PROT SERPL-MCNC: 7.2 G/DL (ref 6–8.2)
RBC # BLD AUTO: 4.53 M/UL (ref 4.7–6.1)
SODIUM SERPL-SCNC: 138 MMOL/L (ref 135–145)
WBC # BLD AUTO: 7.2 K/UL (ref 4.8–10.8)

## 2025-04-11 PROCEDURE — 99284 EMERGENCY DEPT VISIT MOD MDM: CPT

## 2025-04-11 PROCEDURE — 80053 COMPREHEN METABOLIC PANEL: CPT

## 2025-04-11 PROCEDURE — 93005 ELECTROCARDIOGRAM TRACING: CPT | Mod: TC

## 2025-04-11 PROCEDURE — 93005 ELECTROCARDIOGRAM TRACING: CPT | Mod: TC | Performed by: EMERGENCY MEDICINE

## 2025-04-11 PROCEDURE — 36415 COLL VENOUS BLD VENIPUNCTURE: CPT

## 2025-04-11 PROCEDURE — 700105 HCHG RX REV CODE 258: Performed by: EMERGENCY MEDICINE

## 2025-04-11 PROCEDURE — 85025 COMPLETE CBC W/AUTO DIFF WBC: CPT

## 2025-04-11 RX ORDER — SODIUM CHLORIDE, SODIUM LACTATE, POTASSIUM CHLORIDE, CALCIUM CHLORIDE 600; 310; 30; 20 MG/100ML; MG/100ML; MG/100ML; MG/100ML
1000 INJECTION, SOLUTION INTRAVENOUS ONCE
Status: COMPLETED | OUTPATIENT
Start: 2025-04-11 | End: 2025-04-11

## 2025-04-11 RX ADMIN — SODIUM CHLORIDE, POTASSIUM CHLORIDE, SODIUM LACTATE AND CALCIUM CHLORIDE 1000 ML: 600; 310; 30; 20 INJECTION, SOLUTION INTRAVENOUS at 16:39

## 2025-04-11 NOTE — ED TRIAGE NOTES
"Chief Complaint   Patient presents with    Dizziness     X a few days. Pt states minor HA       Pt ambulatory into triage for above. Pt states some nausea as well. Denies hx of migraines. Pt aox4 gcs 15    EKG ordered per protocol               BP 99/68   Pulse 79   Temp 36.4 °C (97.5 °F) (Temporal)   Resp 16   Ht 1.727 m (5' 8\")   Wt 65.5 kg (144 lb 6.4 oz)   SpO2 97%   BMI 21.96 kg/m²     "

## 2025-04-11 NOTE — ED PROVIDER NOTES
ED Provider Note    CHIEF COMPLAINT  Chief Complaint   Patient presents with    Dizziness     X a few days. Pt states minor HA       EXTERNAL RECORDS REVIEWED  Reviewed previous ED visits and baseline labs    HPI/ROS  LIMITATION TO HISTORY   None  OUTSIDE HISTORIAN(S):  None    Chris Larios is a 24 y.o. person who presents to the emergency department for evaluation of episodes of dizziness and feeling like he is lightheaded like he is going to pass out.  The patient has had 2 such episodes in the last 2 days.  He seems to be at work and all of a sudden feels warm and flushed and lightheaded and feels like he is going to pass out.  He has associated dizziness which he cannot describe other than feeling dizzy.  He sits down it does not get any better and then ultimately it does go away.  Patient feels fine now.  Denies any new or different medications although he is on multiple medications.  Denies any decreased in appetite or intake of food or fluids.  No chest pain or shortness of breath.  Is on multiple medications.  No recent drugs or alcohol.  Does have a history of pacemaker secondary to a sinus pause.    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    SURGICAL HISTORY   has a past surgical history that includes pacemaker insertion (Left, 12/12/2008) and tonsillectomy and adenoidectomy.    FAMILY HISTORY  Family History   Problem Relation Age of Onset    Psychiatric Illness Mother         bipolar and Kristen    Schizophrenia Mother     Alcohol/Drug  "Father         alcoholism    Alcohol abuse Father     Cancer Brother         leukemia    Diabetes Maternal Grandmother     Alcohol abuse Paternal Grandmother        SOCIAL HISTORY  Social History     Tobacco Use    Smoking status: Never    Smokeless tobacco: Never   Vaping Use    Vaping status: Never Used   Substance and Sexual Activity    Alcohol use: No    Drug use: Yes     Types: Marijuana, Oral     Comment: rarely    Sexual activity: Never       CURRENT MEDICATIONS  Home Medications       Reviewed by Essence Hubbard R.N. (Registered Nurse) on 04/11/25 at 1420  Med List Status: Not Addressed     Medication Last Dose Status   buPROPion (WELLBUTRIN XL) 300 MG XL tablet  Active   estradiol (ESTRACE) 2 MG Tab  Active   fluoxetine (PROZAC) 20 MG tablet  Active   fluoxetine (PROZAC) 40 MG capsule  Active   progesterone (PROMETRIUM) 100 MG Cap  Active   spironolactone (ALDACTONE) 100 MG Tab  Active                    ALLERGIES  No Known Allergies    PHYSICAL EXAM  VITAL SIGNS: /85   Pulse 95   Temp 36.4 °C (97.5 °F) (Temporal)   Resp 16   Ht 1.727 m (5' 8\")   Wt 65.5 kg (144 lb 6.4 oz)   SpO2 98%   BMI 21.96 kg/m²    Constitutional: Well developed, Well nourished, No acute distress, Non-toxic appearance.   HENT: Normocephalic, Atraumatic,   Eyes: PERRL, EOMI, Conjunctiva normal, No discharge.   Neck: Normal range of motion  Cardiovascular: Normal heart rate, Normal rhythm, No murmurs, No rubs, No gallops.   Thorax & Lungs: Normal breath sounds, No respiratory distress, No wheezing,  Abdomen:  Soft, No tenderness  Musculoskeletal: Good range of motion in all major joints.  Neurologic: Alert,No focal deficits noted.   Psychiatric: Affect normal      EKG/LABS  Results for orders placed or performed during the hospital encounter of 04/11/25   CBC WITH DIFFERENTIAL    Collection Time: 04/11/25  4:42 PM   Result Value Ref Range    WBC 7.2 4.8 - 10.8 K/uL    RBC 4.53 (L) 4.70 - 6.10 M/uL    Hemoglobin 13.4 (L) 14.0 " - 18.0 g/dL    Hematocrit 41.3 (L) 42.0 - 52.0 %    MCV 91.2 81.4 - 97.8 fL    MCH 29.6 27.0 - 33.0 pg    MCHC 32.4 32.3 - 36.5 g/dL    RDW 41.0 35.9 - 50.0 fL    Platelet Count 332 164 - 446 K/uL    MPV 9.5 9.0 - 12.9 fL    Neutrophils-Polys 62.40 44.00 - 72.00 %    Lymphocytes 28.70 22.00 - 41.00 %    Monocytes 7.50 0.00 - 13.40 %    Eosinophils 0.40 0.00 - 6.90 %    Basophils 0.40 0.00 - 1.80 %    Immature Granulocytes 0.60 0.00 - 0.90 %    Nucleated RBC 0.00 0.00 - 0.20 /100 WBC    Neutrophils (Absolute) 4.47 1.82 - 7.42 K/uL    Lymphs (Absolute) 2.06 1.00 - 4.80 K/uL    Monos (Absolute) 0.54 0.00 - 0.85 K/uL    Eos (Absolute) 0.03 0.00 - 0.51 K/uL    Baso (Absolute) 0.03 0.00 - 0.12 K/uL    Immature Granulocytes (abs) 0.04 0.00 - 0.11 K/uL    NRBC (Absolute) 0.00 K/uL   EKG    Collection Time: 25  5:43 PM   Result Value Ref Range    Report       [facility] Emergency Dept.    Test Date:  2025  Pt Name:    KEO MILLER               Department: ER  MRN:        [id number]                      Room:  Gender:     Male                         Technician: [id number]  :        2000                   Requested By:ER TRIAGE PROTOCOL  Order #:    [id number]                    Reading MD: SREEDHAR HANSEN. ASCENCION    Measurements  Intervals                                Axis  Rate:       103                          P:          72  AK:         143                          QRS:        99  QRSD:       91                           T:          69  QT:         380  QTc:        498    Interpretive Statements  Sinus tachycardia  Right atrial enlargement  Probable lateral infarct, old  Prolonged QT interval  Compared to ECG 2018 13:25:01  Atrial abnormality now present  Myocardial infarct finding now present  Prolonged QT interval now present  Electronically Signed On 2025 17:43:09 PDT by SREEDHAR MILLER AMD        I have independently interpreted this EKG    RADIOLOGY/PROCEDURES     No  imaging indicated.  No orders to display       COURSE & MEDICAL DECISION MAKING    ASSESSMENT, COURSE AND PLAN  Care Narrative: 23-year-old male with a history of gender dysphoria and depression on multiple medications presents to the ED with dizzy spells.  In 2008 he had a sinus pause associated with a seizure and had a pacemaker.  This was removed after he got out of high school about 10 years later.  No syncope or other symptoms since that time.  The patient denies any other acute concerns or complaints.  He has not returned back to normal and feels fine at this time.    Differential diagnosis includes but is not limited to situational syncope, dehydration, electrolyte abnormality, polypharmacy, anemia.    Workup with labs and given fluids.  The patient's medication list is reviewed.  Borderline prolonged QT.  There is no significant QT prolonging medications or contraindications on his med list.  Did review his medication list and this concern with the pharmacy.        Hydration: Based on the patient's presentation of Dehydration the patient was given IV fluids. IV Hydration was used because oral hydration was not adequate alone. Upon recheck following hydration, the patient was improved.     Is reassessed 1815 is feeling better.  Would like to go home.  At this point he can go home and follow-up with his primary care doctor.  He will be advised to rest and drink plenty of fluids return for worsening symptoms or any new dizzy spells or complaints.  Questions were answered.  Is agreeable to plan.  Discharged in good condition.    ADDITIONAL PROBLEMS MANAGED  Remote history of a pacemaker      DISPOSITION AND DISCUSSIONS    Rosina Ochoa A.P.R.NAaliyah  [address] [id number] [phone]    Schedule an appointment as soon as possible for a visit in 3 days            FINAL DIAGNOSIS  1. Lightheadedness    2. Near syncope         Electronically signed by: Js Rojas M.D., 4/11/2025 4:50  PM

## 2025-04-12 NOTE — DISCHARGE INSTRUCTIONS
Rest, drink plenty of fluids.  Return to the ED if you have dizziness or passout or have any other symptoms or concerns.  Drink plenty of fluids.  Follow-up with your doctor.

## 2025-04-14 DIAGNOSIS — Z78.9 MALE-TO-FEMALE TRANSGENDER PERSON: ICD-10-CM

## 2025-04-16 RX ORDER — SPIRONOLACTONE 100 MG/1
100 TABLET, FILM COATED ORAL 2 TIMES DAILY
Qty: 180 TABLET | Refills: 0 | Status: SHIPPED | OUTPATIENT
Start: 2025-04-16

## 2025-04-16 RX ORDER — PROGESTERONE 100 MG/1
100 CAPSULE ORAL DAILY
Qty: 90 CAPSULE | Refills: 0 | Status: SHIPPED | OUTPATIENT
Start: 2025-04-16

## 2025-04-16 RX ORDER — ESTRADIOL 2 MG/1
6 TABLET ORAL DAILY
Qty: 90 TABLET | Refills: 0 | Status: SHIPPED | OUTPATIENT
Start: 2025-04-16

## 2025-04-23 ENCOUNTER — TELEMEDICINE (OUTPATIENT)
Dept: BEHAVIORAL HEALTH | Facility: CLINIC | Age: 25
End: 2025-04-23
Payer: COMMERCIAL

## 2025-04-23 DIAGNOSIS — F43.10 PTSD (POST-TRAUMATIC STRESS DISORDER): ICD-10-CM

## 2025-04-23 DIAGNOSIS — F90.2 ATTENTION DEFICIT HYPERACTIVITY DISORDER (ADHD), COMBINED TYPE: ICD-10-CM

## 2025-04-23 DIAGNOSIS — F32.1 CURRENT MODERATE EPISODE OF MAJOR DEPRESSIVE DISORDER WITHOUT PRIOR EPISODE (HCC): ICD-10-CM

## 2025-04-23 DIAGNOSIS — F41.1 GENERALIZED ANXIETY DISORDER: ICD-10-CM

## 2025-04-23 PROCEDURE — 90791 PSYCH DIAGNOSTIC EVALUATION: CPT | Performed by: MARRIAGE & FAMILY THERAPIST
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
## 2025-05-06 ENCOUNTER — APPOINTMENT (OUTPATIENT)
Dept: BEHAVIORAL HEALTH | Facility: CLINIC | Age: 25
End: 2025-05-06
Payer: COMMERCIAL

## 2025-05-19 DIAGNOSIS — Z78.9 MALE-TO-FEMALE TRANSGENDER PERSON: ICD-10-CM

## 2025-05-20 RX ORDER — ESTRADIOL 2 MG/1
6 TABLET ORAL DAILY
Qty: 90 TABLET | Refills: 0 | Status: SHIPPED | OUTPATIENT
Start: 2025-05-20

## 2025-06-18 ENCOUNTER — HOSPITAL ENCOUNTER (EMERGENCY)
Facility: MEDICAL CENTER | Age: 25
End: 2025-06-18
Attending: EMERGENCY MEDICINE
Payer: COMMERCIAL

## 2025-06-18 VITALS
RESPIRATION RATE: 16 BRPM | HEART RATE: 74 BPM | DIASTOLIC BLOOD PRESSURE: 88 MMHG | OXYGEN SATURATION: 99 % | SYSTOLIC BLOOD PRESSURE: 125 MMHG | TEMPERATURE: 97.3 F | HEIGHT: 68 IN | WEIGHT: 144.4 LBS | BODY MASS INDEX: 21.89 KG/M2

## 2025-06-18 DIAGNOSIS — F43.21 SITUATIONAL DEPRESSION: Primary | ICD-10-CM

## 2025-06-18 LAB
AMPHET UR QL SCN: NEGATIVE
BARBITURATES UR QL SCN: NEGATIVE
BENZODIAZ UR QL SCN: NEGATIVE
BZE UR QL SCN: NEGATIVE
CANNABINOIDS UR QL SCN: NEGATIVE
FENTANYL UR QL: NEGATIVE
METHADONE UR QL SCN: NEGATIVE
OPIATES UR QL SCN: NEGATIVE
OXYCODONE UR QL SCN: NEGATIVE
PCP UR QL SCN: NEGATIVE
POC BREATHALIZER: 0 PERCENT (ref 0–0.01)
PROPOXYPH UR QL SCN: NEGATIVE

## 2025-06-18 PROCEDURE — 90791 PSYCH DIAGNOSTIC EVALUATION: CPT

## 2025-06-18 PROCEDURE — 302970 POC BREATHALIZER: Performed by: EMERGENCY MEDICINE

## 2025-06-18 PROCEDURE — 302970 POC BREATHALIZER

## 2025-06-18 PROCEDURE — 99285 EMERGENCY DEPT VISIT HI MDM: CPT

## 2025-06-18 PROCEDURE — 80307 DRUG TEST PRSMV CHEM ANLYZR: CPT

## 2025-06-18 ASSESSMENT — FIBROSIS 4 INDEX: FIB4 SCORE: 0.48

## 2025-06-18 NOTE — ED NOTES
Pt undressed and placed in paper scrubs, belongings placed in belongings bag with facesheet, checked by security and placed in locker 31.  All potentially dangerous items removed from room unless medically necessary for care. Report to Gary

## 2025-06-18 NOTE — ED NOTES
Rounded with pt, denies needs/concerns at this time.  Remains on continuous monitoring, Arash roberson in direct observation of pt.

## 2025-06-18 NOTE — ED PROVIDER NOTES
ER Provider Note    Scribed for Js Rojas M.d. by Margoth Shin. 6/18/2025  2:47 PM    Primary Care Provider: TAMMIE Camejo    CHIEF COMPLAINT   Chief Complaint   Patient presents with    Suicidal Ideation     EXTERNAL RECORDS REVIEWED  None.    HPI/ROS  LIMITATION TO HISTORY   Select: : None  OUTSIDE HISTORIAN(S):  None.    Chris Larios is a 24 y.o. person who presents to the ED complaining of suicidal ideation.  The patient has considered using a knife to stab herself.  Denies any other acute concerns or complaints.  No ingestion.  No self-harm to this point.  Remains suicidal.  Denies any acute medical problems or complaints and denies any alcohol or drugs.    PAST MEDICAL HISTORY  Past Medical History[1]    SURGICAL HISTORY  Past Surgical History[2]    FAMILY HISTORY  Family History   Problem Relation Age of Onset    Psychiatric Illness Mother         bipolar and Kristen    Schizophrenia Mother     Alcohol/Drug Father         alcoholism    Alcohol abuse Father     Cancer Brother         leukemia    Diabetes Maternal Grandmother     Alcohol abuse Paternal Grandmother        SOCIAL HISTORY   reports that she has never smoked. She has never used smokeless tobacco. She reports current drug use. Drugs: Marijuana and Oral. She reports that she does not drink alcohol.    CURRENT MEDICATIONS  Previous Medications    BUPROPION (WELLBUTRIN XL) 300 MG XL TABLET    Take 1 tablet by mouth every morning    ESTRADIOL (ESTRACE) 2 MG TAB    Take 3 Tablets by mouth every day. Please make a follow up appointment for future refills    FLUOXETINE (PROZAC) 20 MG TABLET    Take 1 Tablet by mouth every day. Combine with 40 mg for a total daily dose of 60 mg    FLUOXETINE (PROZAC) 40 MG CAPSULE    Take 1 Capsule by mouth every day.    PROGESTERONE (PROMETRIUM) 100 MG CAP    Take 1 Capsule by mouth every day.    SPIRONOLACTONE (ALDACTONE) 100 MG TAB    Take 1 Tablet by mouth 2 times a day. Dose increase  "      ALLERGIES  Patient has no known allergies.    PHYSICAL EXAM  /88   Pulse 74   Temp 36.3 °C (97.3 °F) (Temporal)   Resp 16   Ht 1.727 m (5' 8\")   Wt 65.5 kg (144 lb 6.4 oz)   SpO2 99%   BMI 21.96 kg/m²   Constitutional: Well developed, Well nourished, No acute distress, Non-toxic appearance.   HENT: Normocephalic, Atraumatic,  Eyes: PERRL, EOMI, Conjunctiva normal, No discharge.   Neck: Normal range of motion  Cardiovascular: Normal heart rate, Normal rhythm, No murmurs,   Thorax & Lungs: Normal breath sounds, No respiratory distress, No wheezing, No chest tenderness.   Abdomen:  Soft, No tenderness  Skin: Warm, Dry, No erythema, No rash.    Musculoskeletal: Good range of motion in all major joints.  Neurologic: Alert, No focal deficits noted.   Psychiatric: Flat affect, suicidal ideation.      DIAGNOSTIC STUDIES    EKG/LABS  Results for orders placed or performed during the hospital encounter of 06/18/25   POC BREATHALIZER    Collection Time: 06/18/25  3:16 PM   Result Value Ref Range    POC Breathalizer 0.00 0.00 - 0.01 Percent        COURSE & MEDICAL DECISION MAKING     ASSESSMENT, COURSE AND PLAN  Care Narrative:     24-year-old male presents to the emergency department for depression and suicidal ideation.  Placed on legal hold breathalyzer drug screen ordered and [facility] evaluation is requested.  The patient is observed in the ED while this workup is performed.    The patient is seen in bed by [facility] at about 1815 Mandie from [facility] recommends lifting to hold.  The patient is no longer suicidal.    The patient is reassessed by me.  He is awake he is alert.  Denies any medical problems or complaints.  Denies being suicidal.  States he now has a good safety plan and follow-up and is comfortable going home.  He will return to the emerged part for any new medical problems or complaints of thoughts of hurting himself or others.  His questions were answered.  He is agreeable to " "plan.    ED OBS: Yes; I am placing the patient in to an observation status due to a diagnostic uncertainty as well as therapeutic intensity. Patient placed in observation status at 3:09 PM, 6/18/2025.     Outpatient resources are provided by [facility].  The patient is cleared for discharge.    DISPOSITION AND DISCUSSIONS      Discussion of management with other QHP or appropriate source(s): Behavioral health.    Escalation of care considered, and ultimately not performed: Laboratory analysis.    Rosina Ochoa, JOSE L.P.R.N.  [address] [id number]  [phone]              FINAL DIANGOSIS  1. Situational depression           The note accurately reflects work and decisions made by me.  Js Rojas M.D.  6/18/2025  6:23 PM         [1]   Past Medical History:  Diagnosis Date    Anxiety and depression     Attention deficit hyperactivity disorder (ADHD), combined type 12/9/2019    Bowel habit changes     constipation    Childhood absence epilepsy (HCC)     treated with \"generic depakote\" per mom    Current moderate episode of major depressive disorder without prior episode (HCC)     Grand mal     last was 2014    History of epilepsy 2008    Patient was diagnosed at age 8 with petit mall seizures treated with Depakote now has a clean EEGsince 2015    Juvenile idiopathic scoliosis of thoracic region     Male-to-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   [2]   Past Surgical History:  Procedure Laterality Date    PACEMAKER INSERTION Left 12/12/2008    Medtronic Adapta [id number] implanted in [place], [place].    TONSILLECTOMY AND ADENOIDECTOMY       "

## 2025-06-18 NOTE — ED TRIAGE NOTES
Pt ambulated into triage with c/o suicidal ideation. Pt sts he thought about stabbing self. Pt sts he left work early and had girlfriend bring pt to hospital. Pt report s feeling suicidal over the last couple days. Has history of suicidal ideation. Pt is A&O and ambulatory. Charge notified.

## 2025-06-19 NOTE — ED NOTES
Discharge instructions reviewed with patient and signed. They verbalized understanding of follow up instructions. All belongings with patient. They ambulate with a steady gait. Outpatient resources given by ALERT team RN. Pt calm and cooperative, given belongings and medications.
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
## 2025-06-19 NOTE — ED NOTES
Rounded with pt, denies needs/concerns at this time.  Remains on continuous monitoring, sitter in direct observation of pt.

## 2025-06-20 ENCOUNTER — TELEMEDICINE (OUTPATIENT)
Dept: BEHAVIORAL HEALTH | Facility: CLINIC | Age: 25
End: 2025-06-20
Payer: COMMERCIAL

## 2025-06-20 DIAGNOSIS — F41.1 GENERALIZED ANXIETY DISORDER: ICD-10-CM

## 2025-06-20 DIAGNOSIS — F33.2 SEVERE RECURRENT MAJOR DEPRESSION WITHOUT PSYCHOTIC FEATURES (HCC): Primary | ICD-10-CM

## 2025-06-20 PROCEDURE — 90834 PSYTX W PT 45 MINUTES: CPT | Mod: 95 | Performed by: MARRIAGE & FAMILY THERAPIST
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
## 2025-07-01 DIAGNOSIS — Z78.9 MALE-TO-FEMALE TRANSGENDER PERSON: ICD-10-CM

## 2025-07-01 RX ORDER — ESTRADIOL 2 MG/1
6 TABLET ORAL DAILY
Qty: 90 TABLET | Refills: 0 | Status: SHIPPED | OUTPATIENT
Start: 2025-07-01 | End: 2025-07-14 | Stop reason: SDUPTHER

## 2025-07-14 DIAGNOSIS — Z78.9 MALE-TO-FEMALE TRANSGENDER PERSON: ICD-10-CM

## 2025-07-14 RX ORDER — ESTRADIOL 2 MG/1
6 TABLET ORAL DAILY
Qty: 90 TABLET | Refills: 0 | Status: SHIPPED | OUTPATIENT
Start: 2025-07-14

## 2025-07-22 ENCOUNTER — PATIENT MESSAGE (OUTPATIENT)
Dept: BEHAVIORAL HEALTH | Facility: CLINIC | Age: 25
End: 2025-07-22

## 2025-07-22 ENCOUNTER — TELEMEDICINE (OUTPATIENT)
Dept: BEHAVIORAL HEALTH | Facility: CLINIC | Age: 25
End: 2025-07-22

## 2025-07-22 DIAGNOSIS — F32.1 CURRENT MODERATE EPISODE OF MAJOR DEPRESSIVE DISORDER WITHOUT PRIOR EPISODE (HCC): ICD-10-CM

## 2025-07-22 DIAGNOSIS — F90.2 ATTENTION DEFICIT HYPERACTIVITY DISORDER (ADHD), COMBINED TYPE: ICD-10-CM

## 2025-07-22 PROCEDURE — 99214 OFFICE O/P EST MOD 30 MIN: CPT | Mod: 95 | Performed by: PSYCHIATRY & NEUROLOGY

## 2025-07-22 PROCEDURE — 96127 BRIEF EMOTIONAL/BEHAV ASSMT: CPT | Mod: 95 | Performed by: PSYCHIATRY & NEUROLOGY

## 2025-07-22 RX ORDER — BUPROPION HYDROCHLORIDE 300 MG/1
TABLET ORAL
Qty: 90 TABLET | Refills: 1 | Status: SHIPPED | OUTPATIENT
Start: 2025-07-22

## 2025-07-22 RX ORDER — FLUOXETINE HYDROCHLORIDE 40 MG/1
40 CAPSULE ORAL DAILY
Qty: 90 CAPSULE | Refills: 1 | Status: SHIPPED | OUTPATIENT
Start: 2025-07-22

## 2025-07-22 RX ORDER — FLUOXETINE 20 MG/1
20 TABLET, FILM COATED ORAL DAILY
Qty: 90 TABLET | Refills: 1 | Status: SHIPPED | OUTPATIENT
Start: 2025-07-22 | End: 2025-07-30 | Stop reason: SDUPTHER

## 2025-07-22 ASSESSMENT — PATIENT HEALTH QUESTIONNAIRE - PHQ9
SUM OF ALL RESPONSES TO PHQ QUESTIONS 1-9: 10
5. POOR APPETITE OR OVEREATING: 0 - NOT AT ALL
CLINICAL INTERPRETATION OF PHQ2 SCORE: 2
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
## 2025-07-26 DIAGNOSIS — Z78.9 MALE-TO-FEMALE TRANSGENDER PERSON: ICD-10-CM

## 2025-07-27 RX ORDER — SPIRONOLACTONE 100 MG/1
100 TABLET, FILM COATED ORAL 2 TIMES DAILY
Qty: 180 TABLET | Refills: 0 | Status: SHIPPED | OUTPATIENT
Start: 2025-07-27

## 2025-07-30 RX ORDER — FLUOXETINE 20 MG/1
20 TABLET, FILM COATED ORAL DAILY
Qty: 90 TABLET | Refills: 1 | Status: SHIPPED | OUTPATIENT
Start: 2025-07-30